# Patient Record
(demographics unavailable — no encounter records)

---

## 2024-10-18 NOTE — RISK ASSESSMENT
[No, patient denies ideation or behavior] : No, patient denies ideation or behavior [Yes, more than three months ago] : Yes, more than three months ago [Mood disorder] : mood disorder [Psychotic disorder] : psychotic disorder [None known] : None known [Identifies reasons for living] : identifies reasons for living [Supportive social network of family or friends] : supportive social network of family or friends [Positive therapeutic relationships] : positive therapeutic relationships [Fear of death/actual act of killing self] : fear of death or the actual act of killing self [Beloved pets] : beloved pets [Low acute suicide risk] : Low acute suicide risk [No] : No [Not clinically indicated] : Safety Plan completed/updated (for individuals at risk): Not clinically indicated [TextBox_32] : in 30s contemplated overdose [FreeTextEntry1] : May have family history of psych disorders requiring hospitalization though not clear; (family with reported schizophrenia)

## 2024-10-18 NOTE — PHYSICAL EXAM
[Cooperative] : cooperative [Clear] : clear [Loud] : loud [[place]] : concrete [WNL] : within normal limits [No] : No [FreeTextEntry1] : morbidly obese female wearing house dress wearing thick glasses [FreeTextEntry8] : neutral [de-identified] : telephone [de-identified] : limited chronically [de-identified] : fair to limited at times

## 2024-10-18 NOTE — REVIEW OF SYSTEMS
[Negative] : Allergic/Immunologic [FreeTextEntry2] : arthritic pains  [FreeTextEntry4] : chronic ulceration of tongue improving per pt [FreeTextEntry6] : shortness of breath with walking reported(chronic)  [FreeTextEntry7] : bouts of diarrhea with recent incontinence since barium swallow mid October 2024 [FreeTextEntry8] : urinary incontinence, chronic with potential accident in office at least once though pt states improved on new medication [FreeTextEntry9] : frequent falls (uses walker) and arthritic pains in shoulders, neck and head per pt [de-identified] : hx of mild mouth movements that may be TD (elicited with distraction only) [de-identified] : chronic odd relatedness secondary to schizophrenia

## 2024-10-18 NOTE — DISCUSSION/SUMMARY
[Date of Last Physical Exam: _____] : Date of Last Physical Exam: [unfilled] [Date of Last Annual Labs: _____] : Date of Last Annual Labs: [unfilled] [Annual Review of Systems Completed?] : Annual Review of Systems Completed: Yes [Tobacco Screening Completed?] : Tobacco Screening Completed: Yes [Date of Last AIMS: _____] : Date of Last AIMS: [unfilled] [Date of Last HbgA1c: _____] : Date of Last HbgA1c: [unfilled] [Date of Last Lipid Profile: _____] : Date of Last Lipid Profile: [unfilled] [Potential impact of patient’s physical health conditions on psychiatric care?] : Potential impact of patient's physical health conditions on psychiatric care: Yes [FreeTextEntry1] : Patient is a 71 year old obese, domiciled in assisted living program, unemployed white female on SSD for much of her life who presented in 2005 for continued treatment of schizophrenia.  Pt with history of approximately five prior psychiatric hospitalizations but has not required hospitalization since in treatment at [facility].   Pt overall at psychiatric baseline though with some episodic anxiety, periods of depression and at times mild paranoia.  Pt oddly related at baseline though overall psychiatrically stable for many years.   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   Overall stable though with reports of fecal incontinence in Oct. 2024 since recent barium swallow study.

## 2024-10-18 NOTE — PLAN
[No] : No [Medication education provided] : Medication education provided. [FreeTextEntry5] : Offered support/encouragement, psychoeducation and counseling regarding diagnosis/diagnoses, medications, symptoms, etc.  Reviewed/discussed plan below:  -Continue Clozapine at 400 mg daily -Have reviewed labs from Sept 2024 and to be done next week Friday; (writer contacted [employer] today) -Continue Abilify at 15 mg daily  -Pt to see [facility] nurse for AIMS every 3 months -To continue Sertraline to 250 mg daily for depression -Continue other non-psychiatric medications as directed  -Continue to reassure patient and offer support and guidance when needed  -To continue f/u about tongue wound treatment- improving per pt report -Have reviewed Kala's experience with [facility]'s Movement Disorder specialist to evaluate possible TD (saw Dr. Leeanne Read) and had been told that she had very mild TD that she was recommended not to take anything for as SEs per pt psychiatric and memory wise are not good; may return to Movement Specialist again if needed though wants to see neurologist through her PCP  -To continue monthly therapy with Dr. Frye -To continue f/u with PCP; and also to f/u with pulmonologist prn -Pt to continue with endocrinologist Pop Lopez (June 6) to continue to address weight issues and work on more aggressive weight loss plan; have discussed elevated BMI and recommendation for weight loss via: exercise, dietary changes and potential use of medications plus recommendation to follow up with PCP and or nutritionist and pt has been intermittently addressing this -SOS-10 to be done when pt not at risk of incontinence in office (some concerning malodor today) -Have reviewed in detail need to limit portion size and eat diabetic diet; reviewed in detail what this would look like and reviewed risks of not following this for patient -Treatment plan completed by writer 9/10/24 -Consult with writer prn  5/25/22: SOS-10 = 39 or mildly impaired 12/9/22 SOS-10:= 42, minimal impairment 8/18/23: SOS-10: = 45, minimal impairment 1/24/24: SOS-10= 55, minimal impairment  40 minutes spent doing E & M and therapy

## 2024-10-18 NOTE — REVIEW OF SYSTEMS
[Negative] : Allergic/Immunologic [FreeTextEntry2] : arthritic pains  [FreeTextEntry4] : chronic ulceration of tongue improving per pt [FreeTextEntry6] : shortness of breath with walking reported(chronic)  [FreeTextEntry7] : bouts of diarrhea with recent incontinence since barium swallow mid October 2024 [FreeTextEntry8] : urinary incontinence, chronic with potential accident in office at least once though pt states improved on new medication [FreeTextEntry9] : frequent falls (uses walker) and arthritic pains in shoulders, neck and head per pt [de-identified] : hx of mild mouth movements that may be TD (elicited with distraction only) [de-identified] : chronic odd relatedness secondary to schizophrenia

## 2024-10-18 NOTE — REASON FOR VISIT
[Telephone (audio) - Individual/Group] : This telephonic visit was provided via audio only technology. [Other Location: e.g. Home (Enter Location, City,State)___] : The provider was located at [unfilled]. [Home] : The patient, [unfilled], was located at home, [unfilled], at the time of the visit. [Verbal consent obtained from patient/other participant(s)] : Verbal consent for telehealth/telephonic services obtained from patient/other participant(s) [Patient] : Patient [FreeTextEntry1] : follow up treatment of Schizophrenia, FARHAN and MDD in remission

## 2024-10-18 NOTE — PLAN
[No] : No [Medication education provided] : Medication education provided. [FreeTextEntry5] : Offered support/encouragement, psychoeducation and counseling regarding diagnosis/diagnoses, medications, symptoms, etc.  Reviewed/discussed plan below:  -Continue Clozapine at 400 mg daily -Have reviewed labs from Sept 2024 and to be done next week Friday; (writer contacted [employer] today) -Continue Abilify at 15 mg daily  -Pt to see [facility] nurse for AIMS every 3 months -To continue Sertraline to 250 mg daily for depression -Continue other non-psychiatric medications as directed  -Continue to reassure patient and offer support and guidance when needed  -To continue f/u about tongue wound treatment- improving per pt report -Have reviewed Kala's experience with [facility]'s Movement Disorder specialist to evaluate possible TD (saw Dr. Leeanne Read) and had been told that she had very mild TD that she was recommended not to take anything for as SEs per pt psychiatric and memory wise are not good; may return to Movement Specialist again if needed though wants to see neurologist through her PCP  -To continue monthly therapy with Dr. Frye -To continue f/u with PCP; and also to f/u with pulmonologist prn -Pt to continue with endocrinologist Pop Lopez (June 6) to continue to address weight issues and work on more aggressive weight loss plan; have discussed elevated BMI and recommendation for weight loss via: exercise, dietary changes and potential use of medications plus recommendation to follow up with PCP and or nutritionist and pt has been intermittently addressing this -SOS-10 to be done when pt not at risk of incontinence in office (some concerning malodor today) -Have reviewed in detail need to limit portion size and eat diabetic diet; reviewed in detail what this would look like and reviewed risks of not following this for patient -Treatment plan completed by writer 9/10/24 -Consult with writer prn  5/25/22: SOS-10 = 39 or mildly impaired 12/9/22 SOS-10:= 42, minimal impairment 8/18/23: SOS-10: = 45, minimal impairment 1/24/24: SOS-10= 55, minimal impairment  40 minutes spent doing E & M and therapy
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
## 2024-10-18 NOTE — HISTORY OF PRESENT ILLNESS
[Not Applicable] : Not applicable [FreeTextEntry1] : Patient with long history of schizophrenia and with emotional difficulties since childhood with reports of unspecified mental problems in her family.  Patient has been relatively psychiatrically stable on her medications since starting [facility] in 2005 without psych hospitalizations though with chronic anxiety, occasional  depression and occasional fleeting paranoia about others mistreating her.   [FreeTextEntry2] : Patient reports history of six prior hospitalizations for depression, suicidal ideations, paranoia and hallucinations.  Stated she would see images (in her mind of scary, menacing clowns or witches laughing at her or would see 'honeymoon lights' flashing at her.)  Patient stated in her 20s she had recurrent visions of a family of mice with the baby mouse doing a dance.  Pt uncertain if these images had appeared realistic as in visual hallucinations or not.  Pt reported that she has a history of hearing people on the television and radio taking directly to her and has felt that she's famous and known all over the world.    Patient reported first hospitalization was at age 12 at [facility] in context of "emotional issues and school phobia."  Had considered herself the "scapegoat" of her family and stated that her parents were "cuckoo."  Pt stated that her second hospitalization was at age 13 at [facility].  Pt went to the [facility] in [place] for residential schooling and treatment;  per pt this was for mentally ill/emotionally disturbed children from "bad families."  Patient stated after three months she returned home because she did not want to stay though said it was a bad decision on her part.  Patient with history of depression, anxiety and feeling "not good enough" as well as history of chronic psychosis.  Patient with hx of passive SI at times and on one occasion in  stated she came close to overdosing but felt a breeze that she thought was a comforting gesture of  family and a friend offering support to her.    Patient was hospitalized again at [facility] in her 20s and reported a hospitalization in her 40s at [facility] and at [facility] also in her 40s.  Pt stated her last hospitalization was when she was 52 years old at [facility] after she accidentally threw out her psychiatric medications.   Patient in therapy (monthly?) with Rosalva Frye since  by reports [FreeTextEntry3] : Orap, Thorazine, Mellaril, Moban, Stelazine, Sinequan, Tofranil, Geodon, Depakote, Klonopin  Currently on Clozapine, Abilify and Zoloft

## 2024-10-18 NOTE — HISTORY OF PRESENT ILLNESS
[Not Applicable] : Not applicable [FreeTextEntry1] : Patient with long history of schizophrenia and with emotional difficulties since childhood with reports of unspecified mental problems in her family.  Patient has been relatively psychiatrically stable on her medications since starting [facility] in 2005 without psych hospitalizations though with chronic anxiety, occasional  depression and occasional fleeting paranoia about others mistreating her.   [FreeTextEntry2] : Patient reports history of six prior hospitalizations for depression, suicidal ideations, paranoia and hallucinations.  Stated she would see images (in her mind of scary, menacing clowns or witches laughing at her or would see 'honeymoon lights' flashing at her.)  Patient stated in her 20s she had recurrent visions of a family of mice with the baby mouse doing a dance.  Pt uncertain if these images had appeared realistic as in visual hallucinations or not.  Pt reported that she has a history of hearing people on the television and radio taking directly to her and has felt that she's famous and known all over the world.    Patient reported first hospitalization was at age 12 at [facility] in context of "emotional issues and school phobia."  Had considered herself the "scapegoat" of her family and stated that her parents were "cuckoo."  Pt stated that her second hospitalization was at age 13 at [facility].  Pt went to the [facility] in [place] for residential schooling and treatment;  per pt this was for mentally ill/emotionally disturbed children from "bad families."  Patient stated after three months she returned home because she did not want to stay though said it was a bad decision on her part.  Patient with history of depression, anxiety and feeling "not good enough" as well as history of chronic psychosis.  Patient with hx of passive SI at times and on one occasion in  stated she came close to overdosing but felt a breeze that she thought was a comforting gesture of  family and a friend offering support to her.    Patient was hospitalized again at [facility] in her 20s and reported a hospitalization in her 40s at [facility] and at [facility] also in her 40s.  Pt stated her last hospitalization was when she was 52 years old at [facility] after she accidentally threw out her psychiatric medications.   Patient in therapy (monthly?) with Rosalva Frye since  by reports [FreeTextEntry3] : Orap, Thorazine, Mellaril, Moban, Stelazine, Sinequan, Tofranil, Geodon, Depakote, Klonopin  Currently on Clozapine, Abilify and Zoloft

## 2024-10-18 NOTE — DISCUSSION/SUMMARY
[Date of Last Physical Exam: _____] : Date of Last Physical Exam: [unfilled] [Date of Last Annual Labs: _____] : Date of Last Annual Labs: [unfilled] [Annual Review of Systems Completed?] : Annual Review of Systems Completed: Yes [Tobacco Screening Completed?] : Tobacco Screening Completed: Yes [Date of Last AIMS: _____] : Date of Last AIMS: [unfilled] [Date of Last HbgA1c: _____] : Date of Last HbgA1c: [unfilled] [Date of Last Lipid Profile: _____] : Date of Last Lipid Profile: [unfilled] [Potential impact of patient’s physical health conditions on psychiatric care?] : Potential impact of patient's physical health conditions on psychiatric care: Yes [FreeTextEntry1] : Patient is a 71 year old obese, domiciled in assisted living program, unemployed white female on SSD for much of her life who presented in 2005 for continued treatment of schizophrenia.  Pt with history of approximately five prior psychiatric hospitalizations but has not required hospitalization since in treatment at [facility].   Pt overall at psychiatric baseline though with some episodic anxiety, periods of depression and at times mild paranoia.  Pt oddly related at baseline though overall psychiatrically stable for many years.   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   Overall stable though with reports of fecal incontinence in Oct. 2024 since recent barium swallow study.

## 2024-10-18 NOTE — PHYSICAL EXAM
[Cooperative] : cooperative [Clear] : clear [Loud] : loud [[place]] : concrete [WNL] : within normal limits [No] : No [FreeTextEntry1] : morbidly obese female wearing house dress wearing thick glasses [FreeTextEntry8] : neutral [de-identified] : telephone [de-identified] : limited chronically [de-identified] : fair to limited at times
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
## 2024-11-14 NOTE — PHYSICAL EXAM
[Cooperative] : cooperative [Clear] : clear [Loud] : loud [[place]] : concrete [WNL] : within normal limits [No] : No [FreeTextEntry1] : morbidly obese female wearing house dress wearing thick glasses [FreeTextEntry8] : neutral [de-identified] : telephone [de-identified] : limited chronically [de-identified] : fair to limited at times

## 2024-11-14 NOTE — PLAN
[No] : No [Medication education provided] : Medication education provided. [FreeTextEntry5] : Offered support/encouragement, psychoeducation and counseling regarding diagnosis/diagnoses, medications, symptoms, etc.  Reviewed/discussed plan below:  -Continue Clozapine at 400 mg daily -Have reviewed labs from October 2024 and will get repeat 11/22/24 confirmed with [employer] today; (order put in 11/14/24 and to renew 5/14/25) -Continue Abilify at 15 mg daily  -Pt to see [facility] nurse for AIMS approximately every 3 months; last seen 10/31/24 and next 2/6/25 -To continue Sertraline to 250 mg daily for depression -Continue other non-psychiatric medications as directed  -Continue to reassure patient and offer support and guidance when needed  -To continue f/u about tongue wound treatment- improving per pt report -Have reviewed Kala's experience with [facility]'s Movement Disorder specialist to evaluate possible TD (saw Dr. Leeanne Read) and had been told that she had very mild TD that she was recommended not to take anything for as SEs per pt psychiatric and memory wise are not good; may return to Movement Specialist again if needed though wants to see neurologist through her PCP  -To continue monthly therapy with Dr. Frye -To continue f/u with PCP; and also to f/u with pulmonologist prn -Pt to continue with endocrinologist Pop Lopez (next 1/2025) to continue to address weight issues and work on more aggressive weight loss plan; have discussed elevated BMI and recommendation for weight loss via: exercise, dietary changes and potential use of medications plus recommendation to follow up with PCP and or nutritionist and pt has been intermittently addressing this -SOS-10 to be done when pt not at risk of incontinence in office -Have reviewed in detail need to limit portion size and eat diabetic diet; reviewed in detail what this would look like and reviewed risks of not following this for patient -Treatment plan completed by writer 9/10/24 -Consult with writer prn  5/25/22: SOS-10 = 39 or mildly impaired 12/9/22 SOS-10:= 42, minimal impairment 8/18/23: SOS-10: = 45, minimal impairment 1/24/24: SOS-10= 55, minimal impairment  30 minute apptmt

## 2024-11-14 NOTE — REASON FOR VISIT
[Telephone (audio) - Individual/Group] : This telephonic visit was provided via audio only technology. [Other Location: e.g. Home (Enter Location, City,State)___] : The provider was located at [unfilled]. [Home] : The patient, [unfilled], was located at home, [unfilled], at the time of the visit. [Participant(s) identity verified] : Participant(s) identity verified. [Verbal consent obtained from patient/other participant(s)] : Verbal consent for telehealth/telephonic services obtained from patient/other participant(s) [Patient] : Patient [FreeTextEntry1] : follow up treatment of Schizophrenia, FARHAN and MDD in remission

## 2024-11-14 NOTE — REVIEW OF SYSTEMS
[Negative] : Allergic/Immunologic [FreeTextEntry2] : arthritic pains, morbid obesity but reports 17 lb weight loss [FreeTextEntry4] : chronic ulceration of tongue, somewhat improved [FreeTextEntry6] : shortness of breath with walking reported(chronic)  [FreeTextEntry7] : bouts of diarrhea with recent incontinence since barium swallow mid October 2024 [FreeTextEntry8] : urinary incontinence, chronic with potential accident in office at least once though pt states improved on new medication [FreeTextEntry9] : frequent falls (uses walker) and arthritic pains in shoulders, neck and head per pt; ankle pain since injury [de-identified] : hx of mild mouth movements that may be TD (elicited with distraction only) [de-identified] : chronic odd relatedness secondary to schizophrenia

## 2024-11-14 NOTE — HISTORY OF PRESENT ILLNESS
[Not Applicable] : Not applicable [FreeTextEntry1] : Patient with long history of schizophrenia and with emotional difficulties since childhood with reports of unspecified mental problems in her family.  Patient has been relatively psychiatrically stable on her medications since starting [facility] in 2005 without psych hospitalizations though with chronic anxiety, occasional  depression and occasional fleeting paranoia about others mistreating her.   [FreeTextEntry2] : Patient reports history of six prior hospitalizations for depression, suicidal ideations, paranoia and hallucinations.  Stated she would see images (in her mind of scary, menacing clowns or witches laughing at her or would see 'honeymoon lights' flashing at her.)  Patient stated in her 20s she had recurrent visions of a family of mice with the baby mouse doing a dance.  Pt uncertain if these images had appeared realistic as in visual hallucinations or not.  Pt reported that she has a history of hearing people on the television and radio taking directly to her and has felt that she's famous and known all over the world.    Patient reported first hospitalization was at age 12 at [facility] in context of "emotional issues and school phobia."  Had considered herself the "scapegoat" of her family and stated that her parents were "cuckoo."  Pt stated that her second hospitalization was at age 13 at [facility].  Pt went to the [facility] in [place] for residential schooling and treatment;  per pt this was for mentally ill/emotionally disturbed children from "bad families."  Patient stated after three months she returned home because she did not want to stay though said it was a bad decision on her part.  Patient with history of depression, anxiety and feeling "not good enough" as well as history of chronic psychosis.  Patient with hx of passive SI at times and on one occasion in  stated she came close to overdosing but felt a breeze that she thought was a comforting gesture of  family and a friend offering support to her.    Patient was hospitalized again at [facility] in her 20s and reported a hospitalization in her 40s at [facility] and at [facility] also in her 40s.  Pt stated her last hospitalization was when she was 52 years old at [facility] after she accidentally threw out her psychiatric medications.   Patient in therapy (monthly?) with Rosalva Frye since  by reports [FreeTextEntry3] : Orap, Thorazine, Mellaril, Moban, Stelazine, Sinequan, Tofranil, Geodon, Depakote, Klonopin  Currently on Clozapine, Abilify and Zoloft

## 2024-11-14 NOTE — DISCUSSION/SUMMARY
[Date of Last Physical Exam: _____] : Date of Last Physical Exam: [unfilled] [Date of Last Annual Labs: _____] : Date of Last Annual Labs: [unfilled] [Annual Review of Systems Completed?] : Annual Review of Systems Completed: Yes [Tobacco Screening Completed?] : Tobacco Screening Completed: Yes [Date of Last AIMS: _____] : Date of Last AIMS: [unfilled] [Date of Last HbgA1c: _____] : Date of Last HbgA1c: [unfilled] [Date of Last Lipid Profile: _____] : Date of Last Lipid Profile: [unfilled] [Potential impact of patient’s physical health conditions on psychiatric care?] : Potential impact of patient's physical health conditions on psychiatric care: Yes [FreeTextEntry1] : Patient is a 71 year old obese, domiciled in assisted living program, unemployed white female on SSD for much of her life who presented in 2005 for continued treatment of schizophrenia.  Pt with history of approximately five prior psychiatric hospitalizations but has not required [facility]   Pt overall at psychiatric baseline though with some episodic anxiety, periods of depression and at times mild paranoia.  Pt oddly related at baseline though overall psychiatrically stable for many years.   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   Overall stable though with reports of fecal incontinence in Oct. 2024 since recent barium swallow study.
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
## 2024-12-07 NOTE — REVIEW OF SYSTEMS
[Negative] : Allergic/Immunologic [FreeTextEntry2] : arthritic pains, morbid obesity but reports 17 lb weight loss [FreeTextEntry4] : chronic ulceration of tongue, somewhat improved [FreeTextEntry6] : shortness of breath with walking reported(chronic)  [FreeTextEntry7] : bouts of diarrhea with recent incontinence since barium swallow mid October 2024 [FreeTextEntry8] : urinary incontinence, chronic with potential accident in office at least once though pt states improved on new medication [FreeTextEntry9] : frequent falls (uses walker) and arthritic pains in shoulders, neck and head per pt; ankle pain since injury [de-identified] : hx of mild mouth movements that may be TD (elicited with distraction only) [de-identified] : chronic odd relatedness secondary to schizophrenia

## 2024-12-07 NOTE — PLAN
[No] : No [Medication education provided] : Medication education provided. [FreeTextEntry5] : Offered support/encouragement, psychoeducation and counseling regarding diagnosis/diagnoses, medications, symptoms, etc.  Reviewed/discussed plan below:  -Continue Clozapine at 400 mg daily -Have reviewed labs from November 22, 2024 and ordered for two weeks from now -Continue Abilify at 15 mg daily  -Pt to see [facility] nurse for AIMS approximately every 3 months; last seen 10/31/24 and next 2/6/25 -To continue Sertraline to 250 mg daily for depression -Continue other non-psychiatric medications as directed  -Continue to reassure patient and offer support and guidance when needed  -To continue f/u about tongue wound treatment- improving per pt report -Have reviewed Kala's experience with [facility]'s Movement Disorder specialist to evaluate possible TD (saw Dr. 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 Frye -To continue f/u with PCP; and also to f/u with pulmonologist prn -May f/u re: nasal sprays and also f/u re: omeprazole and famotidine to see if she's to be taking both as she was told not to  -Pt to continue with endocrinologist Pop Lopez (next 1/2025) to continue to address weight issues and work on more aggressive weight loss plan; have discussed elevated BMI and recommendation for weight loss via: exercise, dietary changes and potential use of medications plus recommendation to follow up with PCP and or nutritionist and pt has been intermittently addressing this -SOS-10 to be done in future -Have reviewed in detail need to limit portion size and eat diabetic diet; reviewed in detail what this would look like and reviewed risks of not following this for patient -Treatment plan completed by writer 9/10/24 -Consult with writer prn  5/25/22: SOS-10 = 39 or mildly impaired 12/9/22 SOS-10:= 42, minimal impairment 8/18/23: SOS-10: = 45, minimal impairment 1/24/24: SOS-10= 55, minimal impairment  45 minutes spent reviewing prior records/notes, reviewing most recent labs, seeing patient and recording session note
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
## 2024-12-07 NOTE — REASON FOR VISIT
[Patient] : Patient [FreeTextEntry1] : follow up treatment of Schizophrenia, FARHAN and MDD in remission

## 2024-12-07 NOTE — HISTORY OF PRESENT ILLNESS
[Not Applicable] : Not applicable [FreeTextEntry1] : Patient with long history of schizophrenia and with emotional difficulties since childhood with reports of unspecified mental problems in her family.  Patient has been relatively psychiatrically stable on her medications since starting [facility] in 2005 without psych hospitalizations though with chronic anxiety, occasional  depression and occasional fleeting paranoia about others mistreating her.   [FreeTextEntry2] : Patient reports history of six prior hospitalizations for depression, suicidal ideations, paranoia and hallucinations.  Stated she would see images (in her mind of scary, menacing clowns or witches laughing at her or would see 'honeymoon lights' flashing at her.)  Patient stated in her 20s she had recurrent visions of a family of mice with the baby mouse doing a dance.  Pt uncertain if these images had appeared realistic as in visual hallucinations or not.  Pt reported that she has a history of hearing people on the television and radio taking directly to her and has felt that she's famous and known all over the world.    Patient reported first hospitalization was at age 12 at [facility] in context of "emotional issues and school phobia."  Had considered herself the "scapegoat" of her family and stated that her parents were "cuckoo."  Pt stated that her second hospitalization was at age 13 at [facility].  Pt went to the [facility] in [place] for residential schooling and treatment;  per pt this was for mentally ill/emotionally disturbed children from "bad families."  Patient stated after three months she returned home because she did not want to stay though said it was a bad decision on her part.  Patient with history of depression, anxiety and feeling "not good enough" as well as history of chronic psychosis.  Patient with hx of passive SI at times and on one occasion in  stated she came close to overdosing but felt a breeze that she thought was a comforting gesture of  family and a friend offering support to her.    Patient was hospitalized again at [facility] in her 20s and reported a hospitalization in her 40s at [facility] and at [facility] also in her 40s.  Pt stated her last hospitalization was when she was 52 years old at [facility] after she accidentally threw out her psychiatric medications.   Patient in therapy (monthly?) with Rosalva Frye since  by reports [FreeTextEntry3] : Orap, Thorazine, Mellaril, Moban, Stelazine, Sinequan, Tofranil, Geodon, Depakote, Klonopin  Currently on Clozapine, Abilify and Zoloft

## 2024-12-07 NOTE — PHYSICAL EXAM
Consults   Nephrology Consult Note    Reason for Consult:  Acute renal injury  Requesting Physician:  Dr Alex Bennett     Chief Complaint: SOB and respiratory distress. History Obtained From:  electronic medical record    History of Present Illness: This is a 67 y.o. male who presented to the hospital for evaluation of  Increasing SOB . Pt was initially evaluated around 15th of this month for SOB however refused admission. He was recently diagnosed with influenza A infection treated with tamiflu  He went back to ER at [facility] and was admitted there for a day and then transferred here for worsening resp status. He was treated for flu and possible PNA. Pt has a hx of chronic AFib and was taking pradaxa at home. He was started on heparin and amiodarone and lopressor for AFib     Looking at his labs he had a normal creat up until 3 days ago. He developed hypotension and shock with low grade fever on 3/27 . Urine output dropped as well. He had a significant drop in his hemoglobin and a CAT scan showed evidence of a large retroperitoneal bleed. He required pressor support and was on 4 pressors initially . Today he is on 3 pressors, off epi. His urine output is still low. Creat has steadily risen to 3.7 today. Creat was 0.8 on 3/26 . Family  denies any hx of heavy or prolonged NSAID use. There is no history of blood or bone marrow disorders. There is no hx of jaundice or hepatitis or sexually transmitted disease. Pt has no hx of collagen vascular disease or vasculitis. No history of dysuria or frequency. No recent procedures involving IV contrast. There is no hx of paraprotein disease. No hx of recurrent UTI , incontinence or recurrent nephrolithiasis. Past Medical History:        Diagnosis Date    Abnormal nuclear stress test 03/12/2010    Stress and resting cardiolite images showed inferior wall hypoperfusion suggestive of previous myocardial infarction.  Left ventricular wall motion showed inferior wall hypokinesia. EF 58%.  Arrhythmia 2007    A single episode    Atrial fibrillation ([address] 2007    Single Episode    CAD (coronary artery disease) 1996    MI    DM type 2 (diabetes mellitus, type 2) ([address] 2005    Hx of echocardiogram 04/05/2007    EF 62%, Increased left atrial and right ventricular diametes. Increased septal and posterior wall thickness suggest left ventricular hypertrophy.  Valves were normal.        Past Surgical History:        Procedure Laterality Date    CARDIOVERSION  05/18/2007    Successful-Dr. Tera Arroyo    CARDIOVERSION  03/13    CORONARY ANGIOPLASTY  1996       Current Medications:      norepinephrine (LEVOPHED) 16 mg in dextrose 5% 250 mL infusion Continuous   piperacillin-tazobactam (ZOSYN) 3.375 g in dextrose 5 % 50 mL IVPB (mini-bag) Q8H   insulin regular (HUMULIN R;NOVOLIN R) 100 Units in sodium chloride 0.9 % 100 mL infusion Continuous   hydrocortisone sodium succinate PF (SOLU-CORTEF) injection 100 mg Q8H   EPINEPHrine (EPINEPHrine HCL) 5 mg in sodium chloride 0.9 % 250 mL infusion Continuous   phenylephrine (KHADRA-SYNEPHRINE) 50 mg in sodium chloride 0.9 % 250 mL infusion Continuous   vasopressin 20 Units in sodium chloride 0.9 % 100 mL infusion Continuous   amiodarone (CORDARONE) 450 mg in sodium chloride 0.9 % 250 mL infusion Continuous   pantoprazole (PROTONIX) injection 40 mg Daily   And    sodium chloride (PF) 0.9 % injection 10 mL Daily   oxyCODONE (ROXICODONE) immediate release tablet 5 mg Q4H PRN   0.9 % sodium chloride infusion Continuous   acetaminophen (TYLENOL) tablet 650 mg Q4H PRN   midazolam (VERSED) 100 mg in dextrose 5% 100 mL infusion Continuous   magnesium hydroxide (MILK OF MAGNESIA) 400 MG/5ML suspension 30 mL Daily PRN   albuterol (PROVENTIL) nebulizer solution 2.5 mg Q6H PRN   docusate (COLACE) 50 MG/5ML liquid 100 mg Daily   LORazepam (ATIVAN) injection 0.5 mg Q8H PRN   sodium chloride flush 0.9 % injection 10 mL 2 times per day   sodium chloride flush 0.9 % injection 10 mL PRN   ondansetron (ZOFRAN) injection 4 mg Q6H PRN   glucose (GLUTOSE) 40 % oral gel 15 g PRN   dextrose 50 % solution 12.5 g PRN   glucagon (rDNA) injection 1 mg PRN   dextrose 5 % solution PRN       Allergies:  Patient has no known allergies. Social History:   Social History     Socioeconomic History    Marital status:      Spouse name: Not on file    Number of children: Not on file    Years of education: Not on file    Highest education level: Not on file   Occupational History    Not on file   Social Needs    Financial resource strain: Not on file    Food insecurity:     Worry: Not on file     Inability: Not on file    Transportation needs:     Medical: Not on file     Non-medical: Not on file   Tobacco Use    Smoking status: Former Smoker     Packs/day: 1.50     Years: 30.00     Pack years: 45.00     Types: Cigarettes     Last attempt to quit: 1996     Years since quittin.8    Smokeless tobacco: Never Used   Substance and Sexual Activity    Alcohol use: No    Drug use: No    Sexual activity: Not on file   Lifestyle    Physical activity:     Days per week: Not on file     Minutes per session: Not on file    Stress: Not on file   Relationships    Social connections:     Talks on phone: Not on file     Gets together: Not on file     Attends Sikh service: Not on file     Active member of club or organization: Not on file     Attends meetings of clubs or organizations: Not on file     Relationship status: Not on file    Intimate partner violence:     Fear of current or ex partner: Not on file     Emotionally abused: Not on file     Physically abused: Not on file     Forced sexual activity: Not on file   Other Topics Concern    Not on file   Social History Narrative    Not on file       Family History:   No family history on file. Review of Systems:    Constitutional: No fever, no chills, no lethargy, no weakness.   HEENT: No headache, otalgia, itchy eyes, nasal discharge or sore throat. Cardiac:  No chest pain, + dyspnea, . Chest:              No cough, phlegm or wheezing. Abdomen:  No abdominal pain, nausea or vomiting. Neuro:  No focal weakness, abnormal movements orseizure like activity. Skin:   No rashes, no itching. :   No hematuria, no pyuria, no dysuria, no flank pain. Extremities:  No swelling or joint pains. Objective:  CURRENT TEMPERATURE:  Temp: 99.9 °F (37.7 °C)  MAXIMUM TEMPERATURE OVER 24HRS:  Temp (24hrs), Av.9 °F (38.3 °C), Min:99.9 °F (37.7 °C), Max:101.5 °F (38.6 °C)    CURRENT RESPIRATORY RATE:  Resp: 22  CURRENT PULSE:  Pulse: 104  CURRENT BLOOD PRESSURE:  BP: 115/69  24HR BLOOD PRESSURE RANGE:  Systolic (24NPH), FDK:16 , Min:70 , LH   ; Diastolic (92NKM), CPX:83, Min:27, Max:87    24HR INTAKE/OUTPUT:      Intake/Output Summary (Last 24 hours) at 3/29/2019 1028  Last data filed at 3/29/2019 0800  Gross per 24 hour   Intake 8650.61 ml   Output 139 ml   Net 8511.61 ml     Patient Vitals for the past 96 hrs (Last 3 readings):   Weight   19 0600 299 lb 13.2 oz (136 kg)   19 0400 280 lb 3.3 oz (127.1 kg)   19 0600 280 lb 10.3 oz (127.3 kg)     Physical Exam:    General appearance: intubated , sedated   Skin: warm and dry, no rash or erythema  Eyes: conjunctivae pale   ENT: : ETT in place    Neck: no carotid bruit ,no  JVD,no carotid Lymphadenopathy, noThyromegaly   Pulmonary: no wheezing or rhonchi. No rales heard.   Cardiovascular: Normal S1 & S2,  No S3 or  S4, no Pericardial Rub no Murmur   Abdomen: bowel sounds not heard , somewhat tense , no ascites  Extremities:no cyanosis, clubbing + edema    Labs:   CBC:  Recent Labs     19  0447 19  0432  19  0416 19  0612 19  0922   WBC 20.1* 27.1*  --   --   --   --  45.3*  --    RBC 3.24* 2.46*  --   --   --   --  2.66*  --    HGB 10.0* 7.6*   < > 9.0*   < > 8.4* 8.2* 8.6*   HCT 31.0* 23.9*   < > 25.6*   < > 24.1* 23.7* 24.7*   MCV 95.7 97.2  --   --   --   --  89.1  --    MCH 30.9 30.9  --   --   --   --  30.8  --    MCHC 32.3 31.8  --   --   --   --  34.6  --    RDW 13.4 13.8  --   --   --   --  14.7*  --     274  --  228  --   --  217  --    MPV 10.7 10.9  --   --   --   --  11.3  --     < > = values in this interval not displayed. BMP: Recent Labs     03/28/19 0432 03/28/19 2029 03/29/19 0612    135 136   K 4.8 4.4 5.0   CL 94* 96* 97*   CO2 33* 21 23   BUN 68* 84* 87*   CREATININE 1.83* 3.21* 3.74*   GLUCOSE 383* 320* 146*   CALCIUM 8.7 7.7* 7.5*        Phosphorus:    Recent Labs     03/27/19 0447 03/28/19 0432 03/29/19  0612   PHOS 3.2 5.1* 5.4*     Magnesium:   Recent Labs     03/27/19 0447 03/28/19 0432 03/29/19 0612   MG 2.0 2.5 2.8*     Albumin:   Recent Labs     03/28/19  1025   LABALBU 2.9*         SPEP: Lab Results   Component Value Date    PROT 5.3 03/28/2019     Urine Creatinine:    Lab Results   Component Value Date    LABCREA 196.8 06/09/2017       Urinalysis:  U/A: Lab Results   Component Value Date    NITRU NEGATIVE 03/28/2019    COLORU DARK YELLOW 03/28/2019    PHUR 5.0 03/28/2019    WBCUA 5 TO 10 03/28/2019    RBCUA 5 TO 10 03/28/2019    MUCUS NOT REPORTED 03/28/2019    TRICHOMONAS NOT REPORTED 03/28/2019    YEAST NOT REPORTED 03/28/2019    BACTERIA NOT REPORTED 03/28/2019    SPECGRAV 1.024 03/28/2019    LEUKOCYTESUR TRACE 03/28/2019    UROBILINOGEN Normal 03/28/2019    BILIRUBINUR NEGATIVE  Verified by ictotest. 03/28/2019    GLUCOSEU TRACE 03/28/2019    KETUA TRACE 03/28/2019    AMORPHOUS NOT REPORTED 03/28/2019     CAT SCAN ;     1. Large left retroperitoneal hematoma extending from the posterior   hemidiaphragm to the upper pelvis.  Hematocrit effect with a hematoma   measuring maximally 9.3 cm in diameter and about 14 cm in length.  There is   also a small amount of upper abdominal ascites.  No free air.    2. Bilateral pleural effusions with [Cooperative] : cooperative [Clear] : clear [Loud] : loud [[place]] : concrete [WNL] : within normal limits [No] : No [de-identified] : telephone [Constricted] : constricted [FreeTextEntry1] : morbidly obese female wearing house dress and thick glasses [FreeTextEntry8] : neutral [de-identified] : limited chronically [de-identified] : fair to limited at times

## 2024-12-07 NOTE — DISCUSSION/SUMMARY
[Date of Last Physical Exam: _____] : Date of Last Physical Exam: [unfilled] [Date of Last Annual Labs: _____] : Date of Last Annual Labs: [unfilled] [Annual Review of Systems Completed?] : Annual Review of Systems Completed: Yes [Tobacco Screening Completed?] : Tobacco Screening Completed: Yes [Date of Last AIMS: _____] : Date of Last AIMS: [unfilled] [Date of Last HbgA1c: _____] : Date of Last HbgA1c: [unfilled] [Date of Last Lipid Profile: _____] : Date of Last Lipid Profile: [unfilled] [Potential impact of patient’s physical health conditions on psychiatric care?] : Potential impact of patient's physical health conditions on psychiatric care: Yes [FreeTextEntry1] : Patient is a 71 year old obese, domiciled in assisted living program, unemployed white female on SSD for much of her life who presented in 2005 for continued treatment of schizophrenia.  Pt with history of approximately five prior psychiatric hospitalizations but has not required hospitalization since in treatment at [facility].   Pt overall at psychiatric baseline though with some episodic anxiety, periods of depression and at times mild paranoia.  Pt oddly related at baseline though overall psychiatrically stable for many years.   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   Overall stable though with reports of periodic incontinence of bladder and bowels.

## 2024-12-07 NOTE — DISCUSSION/SUMMARY
[Date of Last Physical Exam: _____] : Date of Last Physical Exam: [unfilled] [Date of Last Annual Labs: _____] : Date of Last Annual Labs: [unfilled] [Annual Review of Systems Completed?] : Annual Review of Systems Completed: Yes [Tobacco Screening Completed?] : Tobacco Screening Completed: Yes [Date of Last AIMS: _____] : Date of Last AIMS: [unfilled] [Date of Last HbgA1c: _____] : Date of Last HbgA1c: [unfilled] [Date of Last Lipid Profile: _____] : Date of Last Lipid Profile: [unfilled] [Potential impact of patient’s physical health conditions on psychiatric care?] : Potential impact of patient's physical health conditions on psychiatric care: Yes [FreeTextEntry1] : Patient is a 71 year old obese, domiciled in assisted living program, unemployed white female on SSD for much of her life who presented in 2005 for continued treatment of schizophrenia.  Pt with history of approximately five prior psychiatric hospitalizations but has not required hospitalization since in treatment at [facility].   Pt overall at psychiatric baseline though with some episodic anxiety, periods of depression and at times mild paranoia.  Pt oddly related at baseline though overall psychiatrically stable for many years.   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   Overall stable though with reports of periodic incontinence of bladder and bowels.

## 2024-12-07 NOTE — PHYSICAL EXAM
[Cooperative] : cooperative [Clear] : clear [Loud] : loud [[place]] : concrete [WNL] : within normal limits [No] : No [de-identified] : telephone [Constricted] : constricted [FreeTextEntry1] : morbidly obese female wearing house dress and thick glasses [FreeTextEntry8] : neutral [de-identified] : limited chronically [de-identified] : fair to limited at times

## 2024-12-07 NOTE — HISTORY OF PRESENT ILLNESS
[Not Applicable] : Not applicable [FreeTextEntry1] : Patient with long history of schizophrenia and with emotional difficulties since childhood with reports of unspecified mental problems in her family.  Patient has been relatively psychiatrically stable on her medications since starting [facility] in 2005 without psych hospitalizations though with chronic anxiety, occasional  depression and occasional fleeting paranoia about others mistreating her.   [FreeTextEntry2] : Patient reports history of six prior hospitalizations for depression, suicidal ideations, paranoia and hallucinations.  Stated she would see images (in her mind of scary, menacing clowns or witches laughing at her or would see 'honeymoon lights' flashing at her.)  Patient stated in her 20s she had recurrent visions of a family of mice with the baby mouse doing a dance.  Pt uncertain if these images had appeared realistic as in visual hallucinations or not.  Pt reported that she has a history of hearing people on the television and radio taking directly to her and has felt that she's famous and known all over the world.    Patient reported first hospitalization was at age 12 at [facility] in context of "emotional issues and school phobia."  Had considered herself the "scapegoat" of her family and stated that her parents were "cuckoo."  Pt stated that her second hospitalization was at age 13 at [facility].  Pt went to the [facility] in [place] for residential schooling and treatment;  per pt this was for mentally ill/emotionally disturbed children from "bad families."  Patient stated after three months she returned home because she did not want to stay though said it was a bad decision on her part.  Patient with history of depression, anxiety and feeling "not good enough" as well as history of chronic psychosis.  Patient with hx of passive SI at times and on one occasion in  stated she came close to overdosing but felt a breeze that she thought was a comforting gesture of  family and a friend offering support to her.    Patient was hospitalized again at [facility] in her 20s and reported a hospitalization in her 40s at [facility] and at [facility] also in her 40s.  Pt stated her last hospitalization was when she was 52 years old at [facility] after she accidentally threw out her psychiatric medications.   Patient in therapy (monthly?) with Rosalva Frye since  by reports [FreeTextEntry3] : Orap, Thorazine, Mellaril, Moban, Stelazine, Sinequan, Tofranil, Geodon, Depakote, Klonopin  Currently on Clozapine, Abilify and Zoloft

## 2024-12-07 NOTE — PLAN
[No] : No [Medication education provided] : Medication education provided. [FreeTextEntry5] : Offered support/encouragement, psychoeducation and counseling regarding diagnosis/diagnoses, medications, symptoms, etc.  Reviewed/discussed plan below:  -Continue Clozapine at 400 mg daily -Have reviewed labs from November 22, 2024 and ordered for two weeks from now -Continue Abilify at 15 mg daily  -Pt to see [facility] nurse for AIMS approximately every 3 months; last seen 10/31/24 and next 2/6/25 -To continue Sertraline to 250 mg daily for depression -Continue other non-psychiatric medications as directed  -Continue to reassure patient and offer support and guidance when needed  -To continue f/u about tongue wound treatment- improving per pt report -Have reviewed Kala's experience with [facility]'s Movement Disorder specialist to evaluate possible TD (saw Dr. 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 Frye -To continue f/u with PCP; and also to f/u with pulmonologist prn -May f/u re: nasal sprays and also f/u re: omeprazole and famotidine to see if she's to be taking both as she was told not to  -Pt to continue with endocrinologist Pop Lopez (next 1/2025) to continue to address weight issues and work on more aggressive weight loss plan; have discussed elevated BMI and recommendation for weight loss via: exercise, dietary changes and potential use of medications plus recommendation to follow up with PCP and or nutritionist and pt has been intermittently addressing this -SOS-10 to be done in future -Have reviewed in detail need to limit portion size and eat diabetic diet; reviewed in detail what this would look like and reviewed risks of not following this for patient -Treatment plan completed by writer 9/10/24 -Consult with writer prn  5/25/22: SOS-10 = 39 or mildly impaired 12/9/22 SOS-10:= 42, minimal impairment 8/18/23: SOS-10: = 45, minimal impairment 1/24/24: SOS-10= 55, minimal impairment  45 minutes spent reviewing prior records/notes, reviewing most recent labs, seeing patient and recording session note

## 2024-12-07 NOTE — REVIEW OF SYSTEMS
[Negative] : Allergic/Immunologic [FreeTextEntry2] : arthritic pains, morbid obesity but reports 17 lb weight loss [FreeTextEntry4] : chronic ulceration of tongue, somewhat improved [FreeTextEntry6] : shortness of breath with walking reported(chronic)  [FreeTextEntry7] : bouts of diarrhea with recent incontinence since barium swallow mid October 2024 [FreeTextEntry8] : urinary incontinence, chronic with potential accident in office at least once though pt states improved on new medication [FreeTextEntry9] : frequent falls (uses walker) and arthritic pains in shoulders, neck and head per pt; ankle pain since injury [de-identified] : hx of mild mouth movements that may be TD (elicited with distraction only) [de-identified] : chronic odd relatedness secondary to schizophrenia
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
## 2024-12-23 NOTE — PHYSICAL EXAM
[Cooperative] : cooperative [Constricted] : constricted [Clear] : clear [Loud] : loud [[place]] : concrete [WNL] : within normal limits [No] : No [FreeTextEntry1] : morbidly obese female wearing house dress and thick glasses [FreeTextEntry8] : neutral [de-identified] : limited chronically [de-identified] : fair to limited at times

## 2024-12-23 NOTE — DISCUSSION/SUMMARY
[Date of Last Physical Exam: _____] : Date of Last Physical Exam: [unfilled] [Date of Last Annual Labs: _____] : Date of Last Annual Labs: [unfilled] [Annual Review of Systems Completed?] : Annual Review of Systems Completed: Yes [Tobacco Screening Completed?] : Tobacco Screening Completed: Yes [Date of Last AIMS: _____] : Date of Last AIMS: [unfilled] [Date of Last HbgA1c: _____] : Date of Last HbgA1c: [unfilled] [Date of Last Lipid Profile: _____] : Date of Last Lipid Profile: [unfilled] [Potential impact of patient’s physical health conditions on psychiatric care?] : Potential impact of patient's physical health conditions on psychiatric care: Yes [FreeTextEntry1] : Patient is a 71 year old obese, domiciled in assisted living program, unemployed white female on SSD for much of her life who presented in 2005 for continued treatment of schizophrenia.  Pt with history of approximately five prior psychiatric hospitalizations but has not required hospitalization since in treatment at [facility].   Pt overall at psychiatric baseline though with some episodic anxiety, periods of depression and at times mild paranoia.  Pt oddly related at baseline though overall psychiatrically stable for many years.   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   Overall stable though with reports of periodic incontinence of bladder and bowels, some insomnia and forgetfulness.

## 2024-12-23 NOTE — REVIEW OF SYSTEMS
[Negative] : Allergic/Immunologic [FreeTextEntry2] : arthritic pains, morbid obesity but reports 17 lb weight loss [FreeTextEntry4] : chronic ulceration of tongue, somewhat improved [FreeTextEntry6] : shortness of breath with walking reported(chronic)  [FreeTextEntry7] : bouts of diarrhea with recent incontinence since barium swallow mid October 2024 [FreeTextEntry8] : urinary incontinence, chronic with potential accident in office at least once though pt states improved on new medication [FreeTextEntry9] : frequent falls (uses walker) and arthritic pains in shoulders, neck and head per pt; ankle pain since injury [de-identified] : hx of mild mouth movements that may be TD (elicited with distraction only) [de-identified] : chronic odd relatedness secondary to schizophrenia but overall at baseline

## 2024-12-23 NOTE — HISTORY OF PRESENT ILLNESS
[Not Applicable] : Not applicable [FreeTextEntry1] : Patient with long history of schizophrenia and with emotional difficulties since childhood with reports of unspecified mental problems in her family.  Patient has been relatively psychiatrically stable on her medications since starting [facility] in 2005 without psych hospitalizations though with chronic anxiety, occasional  depression and occasional fleeting paranoia about others mistreating her.   [FreeTextEntry2] : Patient reports history of six prior hospitalizations for depression, suicidal ideations, paranoia and hallucinations.  Stated she would see images (in her mind of scary, menacing clowns or witches laughing at her or would see 'honeymoon lights' flashing at her.)  Patient stated in her 20s she had recurrent visions of a family of mice with the baby mouse doing a dance.  Pt uncertain if these images had appeared realistic as in visual hallucinations or not.  Pt reported that she has a history of hearing people on the television and radio taking directly to her and has felt that she's famous and known all over the world.    Patient reported first hospitalization was at age 12 at [facility] in context of "emotional issues and school phobia."  Had considered herself the "scapegoat" of her family and stated that her parents were "cuckoo."  Pt stated that her second hospitalization was at age 13 at [facility].  Pt went to the [facility] in [place] for residential schooling and treatment;  per pt this was for mentally ill/emotionally disturbed children from "bad families."  Patient stated after three months she returned home because she did not want to stay though said it was a bad decision on her part.  Patient with history of depression, anxiety and feeling "not good enough" as well as history of chronic psychosis.  Patient with hx of passive SI at times and on one occasion in  stated she came close to overdosing but felt a breeze that she thought was a comforting gesture of  family and a friend offering support to her.    Patient was hospitalized again at [facility] in her 20s and reported a hospitalization in her 40s at [facility] and at [facility] also in her 40s.  Pt stated her last hospitalization was when she was 52 years old at [facility] after she accidentally threw out her psychiatric medications.   Patient in therapy (monthly?) with Rosalva Frye since  by reports [FreeTextEntry3] : Orap, Thorazine, Mellaril, Moban, Stelazine, Sinequan, Tofranil, Geodon, Depakote, Klonopin  Currently on Clozapine, Abilify and Zoloft
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
## 2024-12-23 NOTE — REASON FOR VISIT
[Patient] : Patient [Patient preference] : as per patient preference [Telephone (audio) - Individual/Group] : This telephonic visit was provided via audio only technology. [Other Location: e.g. Home (Enter Location, City,State)___] : The provider was located at [unfilled]. [Home] : The patient, [unfilled], was located at home, [unfilled], at the time of the visit. [Participant(s) identity verified] : Participant(s) identity verified. [Verbal consent obtained from patient/other participant(s)] : Verbal consent for telehealth/telephonic services obtained from patient/other participant(s) [FreeTextEntry4] : 9:30 am [FreeTextEntry5] : 10 am [FreeTextEntry2] : 12/6/24 [FreeTextEntry1] : follow up treatment of Schizophrenia, FARHAN and MDD in remission

## 2024-12-23 NOTE — PLAN
[No] : No [Medication education provided] : Medication education provided. [FreeTextEntry5] : Offered support/encouragement, psychoeducation and counseling regarding diagnosis/diagnoses, medications, symptoms, etc.  Reviewed/discussed plan below:  -Continue Clozapine at 400 mg daily -Will be getting CBC with diff on 12/27/24 with home draw confirmed via email -Reviewed Benadryl 25-50 mg qhs for insomnia and may consider hydroxyzine or another medication if this is not effective -Continue Abilify at 15 mg daily -Pt to see [facility] nurse for AIMS approximately every 3 months; last seen 10/31/24 and next scheduled for 2/6/25 -To continue Sertraline to 250 mg daily for depression -Continue other non-psychiatric medications as directed  -Continue to reassure patient and offer support and guidance when needed  -To continue f/u about tongue wound status; (appeared much improved at last visit Dec 6,2024 with writer) -Have reviewed Kala's experience with [facility]'s Movement Disorder specialist to evaluate possible TD (saw Dr. 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 Frye -To continue f/u with PCP; and also to f/u with pulmonologist prn -May f/u re: nasal sprays and also f/u re: omeprazole and famotidine to see if she's to be taking both as she was told not to  -Pt to continue with endocrinologist Pop Lopez (next 1/2025 ?) to continue to address weight issues and work on more aggressive weight loss plan; have discussed elevated BMI and recommendation for weight loss via: exercise, dietary changes and potential use of medications plus recommendation to follow up with Jessica re this -Have reviewed in detail need to limit portion size and eat diabetic diet; reviewed in detail what this would look like and reviewed risks of not following this for patient -SOS-10 to be done in future -Treatment plan completed by writer 9/10/24 -Consult with writer prn  5/25/22: SOS-10 = 39 or mildly impaired 12/9/22 SOS-10:= 42, minimal impairment 8/18/23: SOS-10: = 45, minimal impairment 1/24/24: SOS-10= 55, minimal impairment  30 minutes spent reviewing prior records/notes, reviewing most recent labs, seeing patient and recording session note; (telephone visit)

## 2025-04-26 NOTE — REASON FOR VISIT
[Patient no longer able to participate due to medical/psychiatric reasons] : Patient no longer able to participate due to medical/psychiatric reasons [FreeTextEntry9] : 4/29/2005 [FreeTextEntry8] : 4/26/2025 [FreeTextEntry1] : Patient has been inpatient for prolonged period of time due to significant respiratory issues  [FreeTextEntry2] : Per patient was sick since age 12 with "emotional problems and school phobia" that developed into psychosis.

## 2025-04-26 NOTE — HISTORY OF PRESENT ILLNESS
[Suicidal Behavior/Ideation] : suicidal behavior/ideation [Not Applicable] : Not applicable [FreeTextEntry1] : Ese is a 72 year old single, white, morbidly obese female who has been residing in an Assisted Living for several years and supported with SSD and a trust fund that had been arranged by her family.   She has been in treatment at [facility] for Schizophrenia over the past 16 years and has been fairly stable on Clozapine and Abilify though she has some chronic paranoid fears that flare at times as well as chronic anxiety related to her paranoia and depressive episodes.  Ese has additionally been in treatment with a therapist, Melva Cooley for many years.    [FreeTextEntry2] : Patient reports history of six prior hospitalizations for depression, suicidal ideations, paranoia and hallucinations. Stated she would see images (in her mind of scary, menacing clowns or witches laughing at her or would see 'honeymoon lights' flashing at her.) Patient stated in her 20s she had recurrent visions of a family of mice with the baby mouse doing a dance. Pt uncertain if these images had appeared realistic as in visual hallucinations or not. Pt reported that she has a history of hearing people on the television and radio taking directly to her and has felt that she's famous and known all over the world.  Patient reported first hospitalization was at age 12 at [facility] in context of "emotional issues and school phobia." Had considered herself the "scapegoat" of her family and stated that her parents were "cuckoo." Pt stated that her second hospitalization was at age 13 at [facility]. Pt went to the [facility] in [place] for residential schooling and treatment; per pt this was for mentally ill/emotionally disturbed children from "bad families." Patient stated after three months she returned home because she did not want to stay though said it was a bad decision on her part.  Patient with history of depression, anxiety and feeling "not good enough" as well as history of chronic psychosis. Patient with hx of passive SI at times and on one occasion in  stated she came close to overdosing but felt a breeze that she thought was a comforting gesture of  family and a friend offering support to her.  Patient was hospitalized again at [facility] in her 20s and reported a hospitalization in her 40s at [facility] and at [facility] also in her 40s. Pt stated her last hospitalization was when she was 52 years old at [facility] after she accidentally threw out her psychiatric medications.  Patient in therapy (monthly?) with Romaine Frye since  by reports.   [FreeTextEntry3] : Orap, Thorazine, Stephanie, Rene, Stelazine, Sinequan, Tofranil, Geodon, Depakote, Klonopin

## 2025-04-26 NOTE — PLAN
[de-identified] : Closing case due to protracted hospitalization and potential per Kala for transition to rehab/nursing home for unclear duration.
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
## 2025-05-15 NOTE — REVIEW OF SYSTEMS
[Negative] : Allergic/Immunologic [FreeTextEntry9] : continues with falls at times [de-identified] : memory issues but otherwise stable

## 2025-05-15 NOTE — PSYCHOSOCIAL ASSESSMENT
[None known] : None known [HS Diploma or GED] : HS Diploma or GED [Yes (select details below)] : Have you ever experienced this type of event? Yes [tried hard not to think about the event(s) or went out of your way to avoid situations that reminded you of the event] : tried hard to avoid thinking about events or avoid situations that reminded patient of the event [has been constantly on guard, watchful, or easily startled] : has been constantly on guard, watchful, or easily startled [felt guilty or unable to stop blaming yourself or others for the event(s) or any problems the event(s) may have caused] : has felt guilty or unable to stop blaming self or others for event(s), or any problems the event(s) may have caused [Unemployed and not looking for work] : unemployed and not looking for work [SSD] : SSD [Yes] : yes [No] : Prior or current active [place]  service? No [Yes (add details)] : Patient has personal representation (legal guardian, representative payee, conservatorship)? Yes [had nightmares about the event(s) or thought about the event(s) when you did not want] : did not have nightmares and/or unwanted thoughts about the events [felt numb or detached from people, activities, or your surrounding] : has not felt numb or detached from people, activities, or surroundings [FreeTextEntry3] : has a family trust as well [FreeTextEntry6] : Lives in [address] with some services but considered independent residence; has aids daily 9 am-5 pm and may get a few hrs more daily since 2025 hospitalization [FreeTextEntry7] : Friend Harlan Samuel: [phone] [FreeTextEntry8] : Leslie Velasco

## 2025-05-15 NOTE — REVIEW OF SYSTEMS
[Negative] : Allergic/Immunologic [FreeTextEntry9] : continues with falls at times [de-identified] : memory issues but otherwise stable

## 2025-05-15 NOTE — REASON FOR VISIT
[Access issues (e.g., transportation, impaired mobility, etc.)] : due to patient's access issues [Continuity of care] : to ensure continuity of care [Telehealth (audio & video) - Individual/Group] : This visit was provided via telehealth using real-time 2-way audio visual technology. [Other Location: e.g. Home (Enter Location, City,State)___] : The provider was located at [unfilled]. [Home] : The patient, [unfilled], was located at home, [unfilled], at the time of the visit. [Patient's space is appropriate for telehealth and maintains privacy/confidentiality.] : Patient's space is appropriate for telehealth and maintains privacy/confidentiality. [Participant(s) identity verified] : Participant(s) identity verified. [Verbal consent obtained from patient/other participant(s)] : Verbal consent for telehealth/telephonic services obtained from patient/other participant(s)

## 2025-05-15 NOTE — PSYCHOSOCIAL ASSESSMENT
[None known] : None known [HS Diploma or GED] : HS Diploma or GED [Yes (select details below)] : Have you ever experienced this type of event? Yes [tried hard not to think about the event(s) or went out of your way to avoid situations that reminded you of the event] : tried hard to avoid thinking about events or avoid situations that reminded patient of the event [has been constantly on guard, watchful, or easily startled] : has been constantly on guard, watchful, or easily startled [felt guilty or unable to stop blaming yourself or others for the event(s) or any problems the event(s) may have caused] : has felt guilty or unable to stop blaming self or others for event(s), or any problems the event(s) may have caused [Unemployed and not looking for work] : unemployed and not looking for work [SSD] : SSD [Yes] : yes [No] : Prior or current active [place]  service? No [Yes (add details)] : Patient has personal representation (legal guardian, representative payee, conservatorship)? Yes [had nightmares about the event(s) or thought about the event(s) when you did not want] : did not have nightmares and/or unwanted thoughts about the events [felt numb or detached from people, activities, or your surrounding] : has not felt numb or detached from people, activities, or surroundings [FreeTextEntry3] : has a family trust as well [FreeTextEntry6] : Lives in [address] with some services but considered independent residence; has aids daily 9 am-5 pm and may get a few hrs more daily since 2025 hospitalization [FreeTextEntry7] : Friend Harlan Samuel: [phone] [FreeTextEntry8] : Leslie Velasco

## 2025-05-15 NOTE — HISTORY OF PRESENT ILLNESS
[Not Applicable] : Not applicable [FreeTextEntry1] : Kala had been inpatient for respiratory issues for several months and then in rehab facility but now home with cat Sundance past two weeks.  Writer had to close case due to prolonged absence but reopening it again today.   Kala stated that she was told she'd be going to a nursing home if she didn't start ambulating at rehab so she worked hard to get back to walking.  She spoke of having come off of Abilify and also on less Sertraline of 225 mg daily.   [FreeTextEntry2] : Patient with long history of schizophrenia and with emotional difficulties since childhood with reports of unspecified mental problems in her family. Patient has been relatively psychiatrically stable on her medications since starting [facility] in  without psych hospitalizations though with chronic anxiety, occasional depression and occasional fleeting paranoia about others mistreating her.   Patient reports history of six prior hospitalizations for depression, suicidal ideations, paranoia and hallucinations. Stated she would see images (in her mind of scary, menacing clowns or witches laughing at her or would see 'honeymoon lights' flashing at her.) Patient stated in her 20s she had recurrent visions of a family of mice with the baby mouse doing a dance. Pt uncertain if these images had appeared realistic as in visual hallucinations or not. Pt reported that she has a history of hearing people on the television and radio taking directly to her and has felt that she's famous and known all over the world.  Patient reported first hospitalization was at age 12 at [facility] in context of "emotional issues and school phobia." Had considered herself the "scapegoat" of her family and stated that her parents were "cuckoo." Pt stated that her second hospitalization was at age 13 at [facility]. Pt went to the [facility] in [place] for residential schooling and treatment; per pt this was for mentally ill/emotionally disturbed children from "bad families." Patient stated after three months she returned home because she did not want to stay though said it was a bad decision on her part.  Patient with history of depression, anxiety and feeling "not good enough" as well as history of chronic psychosis. Patient with hx of passive SI at times and on one occasion in  stated she came close to overdosing but felt a breeze that she thought was a comforting gesture of  family and a friend offering support to her.  Patient was hospitalized again at [facility] in her 20s and reported a hospitalization in her 40s at [facility] and at [facility] also in her 40s. Pt stated her last hospitalization was when she was 52 years old at [facility] after she accidentally threw out her psychiatric medications.  Patient in therapy (monthly?) with Rosalva Frye since  by reports.   [FreeTextEntry3] : Orap, Thorazine, Mellaril, Moban, Stelazine, Sinequan, Tofranil, Geodon, Depakote, Klonopin  Currently on Clozapine and Zoloft.

## 2025-05-15 NOTE — PLAN
[Admit to Program     (Add Program Admission information to a new column in the Admit/Discharge Flowsheet)] : Admit to program [FreeTextEntry4] : -Continue Clozapine at 400 mg daily -Will be getting CBC with diff in coming days; (available next Monday and Tuesday) -Continue off Abilify  -Pt to see [facility] nurse for AIMS approximately every 3-6 months if able to come in -To continue Sertraline to 250 mg daily for depression -Continue other non-psychiatric medications as directed -Continue to reassure patient and offer support and guidance when needed -F/u about tongue injury/wound -Have reviewed Kala's experience with [facility]'s Movement Disorder specialist to evaluate possible TD (saw Dr. 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 Frye -To look for new PCP as Kala unhappy with most recently assigned one -Pt may continue with endocrinologist Pop Lopez (next 1/2025 ?) to continue to address weight issues and work on more aggressive weight loss plan; have discussed elevated BMI and recommendation for weight loss via: exercise, dietary changes and potential use of medications plus recommendation to follow up with Jessica re this -Have reviewed in detail need to limit portion size and eat diabetic diet; reviewed in detail what this would look like and reviewed risks of not following this for patient -SOS-10 to be done in future -Treatment plan to be completed next session -To reach out for CBCs to restart -Consult with writer prn

## 2025-05-15 NOTE — DISCUSSION/SUMMARY
[FreeTextEntry1] :  Patient is a 72 year old obese, domiciled (in senior living housing), unemployed white female on SSD for much of her life who presented in 2005 for continued treatment of schizophrenia. Pt with history of approximately five prior psychiatric hospitalizations but has not required hospitalization since in treatment at [facility].  Pt overall at psychiatric baseline though with some episodic anxiety, periods of depression and at times mild paranoia. Pt oddly related at baseline though overall psychiatrically stable for many years. 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  She has also had protracted hospitalization (possibly for shortness of breath but pt uncertain) and has experienced multiple falls.  Kala is overall psychiatrically stable at her baseline though with reports of periodic incontinence of bladder and bowels and some insomnia and forgetfulness. [Low acute suicide risk] : Low acute suicide risk [No] : No [Not clinically indicated] : Safety Plan completed/updated (for individuals at risk): Not clinically indicated
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
## 2025-05-15 NOTE — HISTORY OF PRESENT ILLNESS
[Not Applicable] : Not applicable [FreeTextEntry1] : Kala had been inpatient for respiratory issues for several months and then in rehab facility but now home with cat Sundance past two weeks.  Writer had to close case due to prolonged absence but reopening it again today.   Kala stated that she was told she'd be going to a nursing home if she didn't start ambulating at rehab so she worked hard to get back to walking.  She spoke of having come off of Abilify and also on less Sertraline of 225 mg daily.   [FreeTextEntry2] : Patient with long history of schizophrenia and with emotional difficulties since childhood with reports of unspecified mental problems in her family. Patient has been relatively psychiatrically stable on her medications since starting [facility] in  without psych hospitalizations though with chronic anxiety, occasional depression and occasional fleeting paranoia about others mistreating her.   Patient reports history of six prior hospitalizations for depression, suicidal ideations, paranoia and hallucinations. Stated she would see images (in her mind of scary, menacing clowns or witches laughing at her or would see 'honeymoon lights' flashing at her.) Patient stated in her 20s she had recurrent visions of a family of mice with the baby mouse doing a dance. Pt uncertain if these images had appeared realistic as in visual hallucinations or not. Pt reported that she has a history of hearing people on the television and radio taking directly to her and has felt that she's famous and known all over the world.  Patient reported first hospitalization was at age 12 at [facility] in context of "emotional issues and school phobia." Had considered herself the "scapegoat" of her family and stated that her parents were "cuckoo." Pt stated that her second hospitalization was at age 13 at [facility]. Pt went to the [facility] in [place] for residential schooling and treatment; per pt this was for mentally ill/emotionally disturbed children from "bad families." Patient stated after three months she returned home because she did not want to stay though said it was a bad decision on her part.  Patient with history of depression, anxiety and feeling "not good enough" as well as history of chronic psychosis. Patient with hx of passive SI at times and on one occasion in  stated she came close to overdosing but felt a breeze that she thought was a comforting gesture of  family and a friend offering support to her.  Patient was hospitalized again at [facility] in her 20s and reported a hospitalization in her 40s at [facility] and at [facility] also in her 40s. Pt stated her last hospitalization was when she was 52 years old at [facility] after she accidentally threw out her psychiatric medications.  Patient in therapy (monthly?) with Rosalva Frye since  by reports.   [FreeTextEntry3] : Orap, Thorazine, Mellaril, Moban, Stelazine, Sinequan, Tofranil, Geodon, Depakote, Klonopin  Currently on Clozapine and Zoloft.

## 2025-05-15 NOTE — PLAN
[Admit to Program     (Add Program Admission information to a new column in the Admit/Discharge Flowsheet)] : Admit to program [FreeTextEntry4] : -Continue Clozapine at 400 mg daily -Will be getting CBC with diff in coming days; (available next Monday and Tuesday) -Continue off Abilify  -Pt to see [facility] nurse for AIMS approximately every 3-6 months if able to come in -To continue Sertraline to 250 mg daily for depression -Continue other non-psychiatric medications as directed -Continue to reassure patient and offer support and guidance when needed -F/u about tongue injury/wound -Have reviewed Kala's experience with [facility]'s Movement Disorder specialist to evaluate possible TD (saw Dr. 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 Frye -To look for new PCP as Kala unhappy with most recently assigned one -Pt may continue with endocrinologist Pop Lopez (next 1/2025 ?) to continue to address weight issues and work on more aggressive weight loss plan; have discussed elevated BMI and recommendation for weight loss via: exercise, dietary changes and potential use of medications plus recommendation to follow up with Jessica re this -Have reviewed in detail need to limit portion size and eat diabetic diet; reviewed in detail what this would look like and reviewed risks of not following this for patient -SOS-10 to be done in future -Treatment plan to be completed next session -To reach out for CBCs to restart -Consult with writer prn

## 2025-05-15 NOTE — DISCUSSION/SUMMARY
[FreeTextEntry1] :  Patient is a 72 year old obese, domiciled (in senior living housing), unemployed white female on SSD for much of her life who presented in 2005 for continued treatment of schizophrenia. Pt with history of approximately five prior psychiatric hospitalizations but has not required hospitalization since in treatment at [facility].  Pt overall at psychiatric baseline though with some episodic anxiety, periods of depression and at times mild paranoia. Pt oddly related at baseline though overall psychiatrically stable for many years. 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  She has also had protracted hospitalization (possibly for shortness of breath but pt uncertain) and has experienced multiple falls.  Kala is overall psychiatrically stable at her baseline though with reports of periodic incontinence of bladder and bowels and some insomnia and forgetfulness. [Low acute suicide risk] : Low acute suicide risk [No] : No [Not clinically indicated] : Safety Plan completed/updated (for individuals at risk): Not clinically indicated

## 2025-05-27 NOTE — PLAN
[No] : No [Medication education provided] : Medication education provided. [FreeTextEntry5] : Offered support/encouragement, psychoeducation and counseling regarding diagnosis/diagnoses, medications, symptoms, etc.  Reviewed/discussed plan below:  -Continue Clozapine at 400 mg daily -Have ordered CBC with diff through [facility] and emailed them to resume her blood draws -Reports of having discontinues Abilify while inpatient  -Pt to see [facility] nurse for AIMS approximately every 3 months; to reschedule as she was inpatient at last scheduled apptmt -To continue Sertraline at 225 mg daily for depression -Continue other non-psychiatric medications as directed  -Continue to reassure patient and offer support and guidance when needed  -To continue f/u about tongue wound treatment- improving per pt report -Have reviewed Kala's experience with [facility]'s Movement Disorder specialist to evaluate possible TD (saw Dr. 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 Frye -To continue f/u with PCP; and also to f/u with pulmonologist prn -Pt to continue with endocrinologist Pop Lopez (next 1/2025) to continue to address weight issues and work on more aggressive weight loss plan; have discussed elevated BMI and recommendation for weight loss via: exercise, dietary changes and potential use of medications plus recommendation to follow up with PCP and or nutritionist and pt has been intermittently addressing this -SOS-10 may be done in future -Have reviewed in detail need to limit portion size and eat diabetic diet; have reviewed mutliple times in detail what this should look like and reviewed risks of not following this for patient; pt aware of her BMI, obesity and risks of this including metabolic syndrome and has connection to weight loss doctor whom she connects with periodically -Treatment plan completed by writer 9/10/24 -Consult with writer prn  5/25/22: SOS-10 = 39 or mildly impaired 12/9/22 SOS-10:= 42, minimal impairment 8/18/23: SOS-10: = 45, minimal impairment 1/24/24: SOS-10= 55, minimal impairment  30 minutes spent reviewing prior records/notes, reviewing most recent labs, seeing patient and recording session note

## 2025-05-27 NOTE — PLAN
[No] : No [Medication education provided] : Medication education provided. [FreeTextEntry5] : Offered support/encouragement, psychoeducation and counseling regarding diagnosis/diagnoses, medications, symptoms, etc.  Reviewed/discussed plan below:  -Continue Clozapine at 400 mg daily -Have ordered CBC with diff through [facility] and emailed them to resume her blood draws -Reports of having discontinues Abilify while inpatient  -Pt to see [facility] nurse for AIMS approximately every 3 months; to reschedule as she was inpatient at last scheduled apptmt -To continue Sertraline at 225 mg daily for depression -Continue other non-psychiatric medications as directed  -Continue to reassure patient and offer support and guidance when needed  -To continue f/u about tongue wound treatment- improving per pt report -Have reviewed Kala's experience with [facility]'s Movement Disorder specialist to evaluate possible TD (saw Dr. 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 Frye -To continue f/u with PCP; and also to f/u with pulmonologist prn -Pt to continue with endocrinologist Pop Lopez (next 1/2025) to continue to address weight issues and work on more aggressive weight loss plan; have discussed elevated BMI and recommendation for weight loss via: exercise, dietary changes and potential use of medications plus recommendation to follow up with PCP and or nutritionist and pt has been intermittently addressing this -SOS-10 may be done in future -Have reviewed in detail need to limit portion size and eat diabetic diet; have reviewed mutliple times in detail what this should look like and reviewed risks of not following this for patient; pt aware of her BMI, obesity and risks of this including metabolic syndrome and has connection to weight loss doctor whom she connects with periodically -Treatment plan completed by writer 9/10/24 -Consult with writer prn  5/25/22: SOS-10 = 39 or mildly impaired 12/9/22 SOS-10:= 42, minimal impairment 8/18/23: SOS-10: = 45, minimal impairment 1/24/24: SOS-10= 55, minimal impairment  30 minutes spent reviewing prior records/notes, reviewing most recent labs, seeing patient and recording session note

## 2025-05-27 NOTE — PHYSICAL EXAM
[Cooperative] : cooperative [Constricted] : constricted [Clear] : clear [Loud] : loud [[place]] : concrete [WNL] : within normal limits [No] : No [FreeTextEntry1] : morbidly obese female wearing house dress and thick glasses [FreeTextEntry8] : neutral [de-identified] : limited chronically [de-identified] : fair to limited at times

## 2025-05-27 NOTE — DISCUSSION/SUMMARY
[Date of Last Physical Exam: _____] : Date of Last Physical Exam: [unfilled] [Date of Last Annual Labs: _____] : Date of Last Annual Labs: [unfilled] [Annual Review of Systems Completed?] : Annual Review of Systems Completed: Yes [Tobacco Screening Completed?] : Tobacco Screening Completed: Yes [Date of Last AIMS: _____] : Date of Last AIMS: [unfilled] [Date of Last HbgA1c: _____] : Date of Last HbgA1c: [unfilled] [Date of Last Lipid Profile: _____] : Date of Last Lipid Profile: [unfilled] [Potential impact of patient’s physical health conditions on psychiatric care?] : Potential impact of patient's physical health conditions on psychiatric care: Yes [FreeTextEntry1] : Patient is a 712 year old obese, domiciled in assisted living program, unemployed white female on SSD for much of her life who presented in 2005 for continued treatment of schizophrenia.  Pt with history of approximately five prior psychiatric hospitalizations but has not required hospitalization since in treatment at [facility].   Pt overall at psychiatric baseline though with some episodic anxiety, periods of depression and at times mild paranoia.  Pt oddly related at baseline though overall psychiatrically stable for many years.   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   Overall at her baseline though with reports of periodic incontinence of bladder and bowels.

## 2025-05-27 NOTE — DISCUSSION/SUMMARY
[Date of Last Physical Exam: _____] : Date of Last Physical Exam: [unfilled] [Date of Last Annual Labs: _____] : Date of Last Annual Labs: [unfilled] [Annual Review of Systems Completed?] : Annual Review of Systems Completed: Yes [Tobacco Screening Completed?] : Tobacco Screening Completed: Yes [Date of Last AIMS: _____] : Date of Last AIMS: [unfilled] [Date of Last HbgA1c: _____] : Date of Last HbgA1c: [unfilled] [Date of Last Lipid Profile: _____] : Date of Last Lipid Profile: [unfilled] [Potential impact of patient’s physical health conditions on psychiatric care?] : Potential impact of patient's physical health conditions on psychiatric care: Yes [FreeTextEntry1] : Patient is a 712 year old obese, domiciled in assisted living program, unemployed white female on SSD for much of her life who presented in 2005 for continued treatment of schizophrenia.  Pt with history of approximately five prior psychiatric hospitalizations but [facility] not required [facility]   Pt overall at psychiatric baseline though with some episodic anxiety, periods of depression and at times mild paranoia.  Pt oddly related at baseline though overall psychiatrically stable for many years.   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   Overall at her baseline though with reports of periodic incontinence of bladder and bowels.
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
## 2025-05-27 NOTE — HISTORY OF PRESENT ILLNESS
[Not Applicable] : Not applicable [FreeTextEntry1] : Patient with long history of schizophrenia and with emotional difficulties since childhood with reports of unspecified mental problems in her family.  Patient has been relatively psychiatrically stable on her medications since starting [facility] in 2005 without psych hospitalizations though with chronic anxiety, occasional  depression and occasional fleeting paranoia about others mistreating her.   [FreeTextEntry2] : Patient reports history of six prior hospitalizations for depression, suicidal ideations, paranoia and hallucinations.  Stated she would see images (in her mind of scary, menacing clowns or witches laughing at her or would see 'honeymoon lights' flashing at her.)  Patient stated in her 20s she had recurrent visions of a family of mice with the baby mouse doing a dance.  Pt uncertain if these images had appeared realistic as in visual hallucinations or not.  Pt reported that she has a history of hearing people on the television and radio taking directly to her and has felt that she's famous and known all over the world.    Patient reported first hospitalization was at age 12 at [facility] in context of "emotional issues and school phobia."  Had considered herself the "scapegoat" of her family and stated that her parents were "cuckoo."  Pt stated that her second hospitalization was at age 13 at [facility].  Pt went to the [facility] in [place] for residential schooling and treatment;  per pt this was for mentally ill/emotionally disturbed children from "bad families."  Patient stated after three months she returned home because she did not want to stay though said it was a bad decision on her part.  Patient with history of depression, anxiety and feeling "not good enough" as well as history of chronic psychosis.  Patient with hx of passive SI at times and on one occasion in  stated she came close to overdosing but felt a breeze that she thought was a comforting gesture of  family and a friend offering support to her.    Patient was hospitalized again at [facility] in her 20s and reported a hospitalization in her 40s at [facility] and at [facility] also in her 40s.  Pt stated her last hospitalization was when she was 52 years old at [facility] after she accidentally threw out her psychiatric medications.   Patient in therapy (monthly?) with Rosalva Frye since  by reports [FreeTextEntry3] : Orap, Thorazine, Mellaril, Moban, Stelazine, Sinequan, Tofranil, Geodon, Depakote, Klonopin  Currently on Clozapine, Abilify and Zoloft

## 2025-05-27 NOTE — REASON FOR VISIT
[Patient preference] : as per patient preference [Access issues (e.g., transportation, impaired mobility, etc.)] : due to patient's access issues [Continuity of care] : to ensure continuity of care [Telephone (audio) - Individual/Group] : This telephonic visit was provided via audio only technology. [Home] : The patient, [unfilled], was located at home, [unfilled], at the time of the visit. [Patient's space is appropriate for telehealth and maintains privacy/confidentiality.] : Patient's space is appropriate for telehealth and maintains privacy/confidentiality. [Participant(s) identity verified] : Participant(s) identity verified. [Verbal consent obtained from patient/other participant(s)] : Verbal consent for telehealth/telephonic services obtained from patient/other participant(s) [Patient] : Patient [FreeTextEntry2] : 12/6/24 [FreeTextEntry1] : follow up treatment of Schizophrenia, FARHAN and MDD in remission

## 2025-05-27 NOTE — PHYSICAL EXAM
[Cooperative] : cooperative [Constricted] : constricted [Clear] : clear [Loud] : loud [[place]] : concrete [WNL] : within normal limits [No] : No [FreeTextEntry1] : morbidly obese female wearing house dress and thick glasses [FreeTextEntry8] : neutral [de-identified] : limited chronically [de-identified] : fair to limited at times
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
## 2025-05-27 NOTE — REVIEW OF SYSTEMS
[Negative] : Allergic/Immunologic [FreeTextEntry2] : arthritic pains  [FreeTextEntry4] : chronic ulceration of tongue improved by reports [FreeTextEntry6] : shortness of breath with walking reported (chronic)  [FreeTextEntry7] : bouts of diarrhea  [FreeTextEntry8] : urinary incontinence, chronic with potential accident in office at least once though pt states improved on new medication [FreeTextEntry9] : frequent falls (uses walker) and arthritic pains in shoulders, neck and head per pt; ankle pain since injury [de-identified] : hx of mild mouth movements that may be TD (elicited with distraction only) [de-identified] : chronic odd relatedness secondary to schizophrenia

## 2025-05-27 NOTE — REVIEW OF SYSTEMS
[Negative] : Allergic/Immunologic [FreeTextEntry2] : arthritic pains  [FreeTextEntry4] : chronic ulceration of tongue improved by reports [FreeTextEntry6] : shortness of breath with walking reported (chronic)  [FreeTextEntry7] : bouts of diarrhea  [FreeTextEntry8] : urinary incontinence, chronic with potential accident in office at least once though pt states improved on new medication [FreeTextEntry9] : frequent falls (uses walker) and arthritic pains in shoulders, neck and head per pt; ankle pain since injury [de-identified] : hx of mild mouth movements that may be TD (elicited with distraction only) [de-identified] : chronic odd relatedness secondary to schizophrenia

## 2025-05-27 NOTE — HISTORY OF PRESENT ILLNESS
[Not Applicable] : Not applicable [FreeTextEntry1] : Patient with long history of schizophrenia and with emotional difficulties since childhood with reports of unspecified mental problems in her family.  Patient has been relatively psychiatrically stable on her medications since starting [facility] in 2005 without psych hospitalizations though with chronic anxiety, occasional  depression and occasional fleeting paranoia about others mistreating her.   [FreeTextEntry2] : Patient reports history of six prior hospitalizations for depression, suicidal ideations, paranoia and hallucinations.  Stated she would see images (in her mind of scary, menacing clowns or witches laughing at her or would see 'honeymoon lights' flashing at her.)  Patient stated in her 20s she had recurrent visions of a family of mice with the baby mouse doing a dance.  Pt uncertain if these images had appeared realistic as in visual hallucinations or not.  Pt reported that she has a history of hearing people on the television and radio taking directly to her and has felt that she's famous and known all over the world.    Patient reported first hospitalization was at age 12 at [facility] in context of "emotional issues and school phobia."  Had considered herself the "scapegoat" of her family and stated that her parents were "cuckoo."  Pt stated that her second hospitalization was at age 13 at [facility].  Pt went to the [facility] in [place] for residential schooling and treatment;  per pt this was for mentally ill/emotionally disturbed children from "bad families."  Patient stated after three months she returned home because she did not want to stay though said it was a bad decision on her part.  Patient with history of depression, anxiety and feeling "not good enough" as well as history of chronic psychosis.  Patient with hx of passive SI at times and on one occasion in  stated she came close to overdosing but felt a breeze that she thought was a comforting gesture of  family and a friend offering support to her.    Patient was hospitalized again at [facility] in her 20s and reported a hospitalization in her 40s at [facility] and at [facility] also in her 40s.  Pt stated her last hospitalization was when she was 52 years old at [facility] after she accidentally threw out her psychiatric medications.   Patient in therapy (monthly?) with Rosalva Frye since  by reports [FreeTextEntry3] : Orap, Thorazine, Mellaril, Moban, Stelazine, Sinequan, Tofranil, Geodon, Depakote, Klonopin  Currently on Clozapine, Abilify and Zoloft

## 2025-06-25 NOTE — HISTORY OF PRESENT ILLNESS
[Not Applicable] : Not applicable [FreeTextEntry1] : Patient with long history of schizophrenia and with emotional difficulties since childhood with reports of unspecified mental problems in her family.  Patient has been relatively psychiatrically stable on her medications since starting [facility] in 2005 without psych hospitalizations though with chronic anxiety, occasional  depression and occasional fleeting paranoia about others mistreating her.   [FreeTextEntry2] : Patient reports history of six prior hospitalizations for depression, suicidal ideations, paranoia and hallucinations.  Stated she would see images (in her mind of scary, menacing clowns or witches laughing at her or would see 'honeymoon lights' flashing at her.)  Patient stated in her 20s she had recurrent visions of a family of mice with the baby mouse doing a dance.  Pt uncertain if these images had appeared realistic as in visual hallucinations or not.  Pt reported that she has a history of hearing people on the television and radio taking directly to her and has felt that she's famous and known all over the world.    Patient reported first hospitalization was at age 12 at [facility] in context of "emotional issues and school phobia."  Had considered herself the "scapegoat" of her family and stated that her parents were "cuckoo."  Pt stated that her second hospitalization was at age 13 at [facility].  Pt went to the [facility] in [place] for residential schooling and treatment;  per pt this was for mentally ill/emotionally disturbed children from "bad families."  Patient stated after three months she returned home because she did not want to stay though said it was a bad decision on her part.  Patient with history of depression, anxiety and feeling "not good enough" as well as history of chronic psychosis.  Patient with hx of passive SI at times and on one occasion in  stated she came close to overdosing but felt a breeze that she thought was a comforting gesture of  family and a friend offering support to her.    Patient was hospitalized again at [facility] in her 20s and reported a hospitalization in her 40s at [facility] and at [facility] also in her 40s.  Pt stated her last hospitalization was when she was 52 years old at [facility] after she accidentally threw out her psychiatric medications.   Patient in therapy (monthly?) with Rosalva Frye since  by reports [FreeTextEntry3] : Orap, Thorazine, Mellaril, Moban, Stelazine, Sinequan, Tofranil, Geodon, Depakote, Klonopin  Currently on Clozapine, Abilify and Zoloft

## 2025-06-25 NOTE — PHYSICAL EXAM
[Cooperative] : cooperative [Clear] : clear [Loud] : loud [[place]] : concrete [WNL] : within normal limits [No] : No [FreeTextEntry8] : neutral [de-identified] : limited chronically [de-identified] : fair to limited at times

## 2025-06-25 NOTE — REVIEW OF SYSTEMS
[Negative] : Allergic/Immunologic [FreeTextEntry2] : arthritic pains  [FreeTextEntry4] : chronic ulceration of tongue fully resolved per pt reports [FreeTextEntry6] : shortness of breath with walking reported (chronic)  [FreeTextEntry7] : bouts of diarrhea  [FreeTextEntry8] : urinary incontinence, chronic with potential accident in office at least once though pt states improved on new medication [FreeTextEntry9] : frequent falls (uses walker) and arthritic pains in shoulders, neck and head per pt; ankle pain since injury [de-identified] : hx of mild mouth movements that may be TD (elicited with distraction only) [de-identified] : chronic odd relatedness secondary to schizophrenia

## 2025-06-25 NOTE — REASON FOR VISIT
[Patient preference] : as per patient preference [Access issues (e.g., transportation, impaired mobility, etc.)] : due to patient's access issues [Continuity of care] : to ensure continuity of care [Telephone (audio) - Individual/Group] : This telephonic visit was provided via audio only technology. [Other Location: e.g. Home (Enter Location, City,State)___] : The provider was located at [unfilled]. [Home] : The patient, [unfilled], was located at home, [unfilled], at the time of the visit. [Patient's space is appropriate for telehealth and maintains privacy/confidentiality.] : Patient's space is appropriate for telehealth and maintains privacy/confidentiality. [Participant(s) identity verified] : Participant(s) identity verified. [Verbal consent obtained from patient/other participant(s)] : Verbal consent for telehealth/telephonic services obtained from patient/other participant(s) [Patient] : Patient [FreeTextEntry2] : 12/6/24 [FreeTextEntry1] : follow up treatment of Schizophrenia, FARHAN and MDD in remission

## 2025-06-25 NOTE — DISCUSSION/SUMMARY
[Date of Last Physical Exam: _____] : Date of Last Physical Exam: [unfilled] [Date of Last Annual Labs: _____] : Date of Last Annual Labs: [unfilled] [Annual Review of Systems Completed?] : Annual Review of Systems Completed: Yes [Tobacco Screening Completed?] : Tobacco Screening Completed: Yes [Date of Last AIMS: _____] : Date of Last AIMS: [unfilled] [Date of Last HbgA1c: _____] : Date of Last HbgA1c: [unfilled] [Date of Last Lipid Profile: _____] : Date of Last Lipid Profile: [unfilled] [Potential impact of patient’s physical health conditions on psychiatric care?] : Potential impact of patient's physical health conditions on psychiatric care: Yes [FreeTextEntry1] : Patient is a 72 year old obese, domiciled in assisted living program, unemployed white female on SSD for much of her life who presented in 2005 for continued treatment of schizophrenia.  Pt with history of approximately five prior psychiatric hospitalizations but has not required hospitalization since in treatment at [facility].   Pt overall at psychiatric baseline though with some episodic anxiety, periods of depression and at times mild paranoia.  Pt oddly related at baseline though overall psychiatrically stable for many years.   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   Overall at her baseline though with reports of periodic incontinence of bladder and bowels.

## 2025-06-25 NOTE — PLAN
[No] : No [Medication education provided] : Medication education provided. [FreeTextEntry5] : Offered support/encouragement, psychoeducation and counseling regarding diagnosis/diagnoses, medications, symptoms, etc.  Reviewed/discussed plan below:  -Continue Clozapine at 400 mg daily -Have ordered CBC with diff through [facility] and pt stated she just got completed a few days ago though no results found; emailed [facility] and lab for follow up around this -Reports of having discontinues Abilify while inpatient; may remain off it -Pt to see [facility] nurse for AIMS approximately every 3 months; to reschedule with nurse or for in person and AIMs with writer -To continue Sertraline at 225 mg daily for depression -Continue other non-psychiatric medications as directed  -Continue to reassure patient and offer support and guidance when needed  -Have reviewed Kala's experience with [facility]'s Movement Disorder specialist to evaluate possible TD (saw Dr. 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 Frye -To continue f/u with PCP; and also to f/u with pulmonologist prn -Pt to continue with endocrinologist Pop Lopez (next 1/2025) to continue to address weight issues and work on more aggressive weight loss plan; have discussed elevated BMI and recommendation for weight loss via: exercise, dietary changes and potential use of medications plus recommendation to follow up with PCP and or nutritionist and pt has been intermittently addressing this -SOS-10 may be done in future -Have reviewed in detail need to limit portion size and eat diabetic diet; have reviewed mutliple times in detail what this should look like and reviewed risks of not following this for patient; pt aware of her BMI, obesity and risks of this including metabolic syndrome and has connection to weight loss doctor whom she connects with periodically -Treatment plan completed by writer 9/10/24 -Consult with writer prn  5/25/22: SOS-10 = 39 or mildly impaired 12/9/22 SOS-10:= 42, minimal impairment 8/18/23: SOS-10: = 45, minimal impairment 1/24/24: SOS-10= 55, minimal impairment  30 minutes spent reviewing prior records/notes, troubleshooting around [facility]'s most recent labs, seeing patient and recording session note
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
## 2025-07-24 NOTE — HISTORY OF PRESENT ILLNESS
[Not Applicable] : Not applicable [FreeTextEntry1] : Patient with long history of schizophrenia and with emotional difficulties since childhood with reports of unspecified mental problems in her family.  Patient has been relatively psychiatrically stable on her medications since starting [facility] in 2005 without psych hospitalizations though with chronic anxiety, occasional  depression and occasional fleeting paranoia about others mistreating her.   [FreeTextEntry2] : Patient reports history of six prior hospitalizations for depression, suicidal ideations, paranoia and hallucinations.  Stated she would see images (in her mind of scary, menacing clowns or witches laughing at her or would see 'honeymoon lights' flashing at her.)  Patient stated in her 20s she had recurrent visions of a family of mice with the baby mouse doing a dance.  Pt uncertain if these images had appeared realistic as in visual hallucinations or not.  Pt reported that she has a history of hearing people on the television and radio taking directly to her and has felt that she's famous and known all over the world.    Patient reported first hospitalization was at age 12 at [facility] in context of "emotional issues and school phobia."  Had considered herself the "scapegoat" of her family and stated that her parents were "cuckoo."  Pt stated that her second hospitalization was at age 13 at [facility].  Pt went to the [facility] in [place] for residential schooling and treatment;  per pt this was for mentally ill/emotionally disturbed children from "bad families."  Patient stated after three months she returned home because she did not want to stay though said it was a bad decision on her part.  Patient with history of depression, anxiety and feeling "not good enough" as well as history of chronic psychosis.  Patient with hx of passive SI at times and on one occasion in  stated she came close to overdosing but felt a breeze that she thought was a comforting gesture of  family and a friend offering support to her.    Patient was hospitalized again at [facility] in her 20s and reported a hospitalization in her 40s at [facility] and at [facility] also in her 40s.  Pt stated her last hospitalization was when she was 52 years old at [facility] after she accidentally threw out her psychiatric medications.   Patient in therapy (monthly?) with Rosalva Frye since  by reports [FreeTextEntry3] : Orap, Thorazine, Mellaril, Moban, Stelazine, Sinequan, Tofranil, Geodon, Depakote, Klonopin  Currently on Clozapine, Abilify and Zoloft

## 2025-07-24 NOTE — PLAN
Infectious Disease Chart Check    Afebrile.    Note very elevated BNP and cardiology and nephrology involvement.    Continue current antibiotics until 3/1.    Following peripherally.  Please call if questions or concerns.    Balwinder Rose MD     [No] : No [Medication education provided] : Medication education provided. [FreeTextEntry5] : Offered support/encouragement, psychoeducation and counseling regarding diagnosis/diagnoses, medications, symptoms, etc.  Reviewed/discussed plan below:  -Continue Clozapine at 400 mg daily -Have ordered CBC with diff through [facility] and emailed them; alerted them that pt will not be available tomorrow for a visit -Reports of having discontinues Abilify while inpatient; may remain off it -Pt to see [facility] nurse for AIMS at least once a year -To continue Sertraline at 225 mg daily for depression -Continue other non-psychiatric medications as directed  -Continue to reassure patient and offer support and guidance when needed  -Have reviewed Kala's experience with [facility]'s Movement Disorder specialist to evaluate possible TD (saw Dr. 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 Frye -To continue f/u with PCP; and also to f/u with pulmonologist prn -Pt to continue with endocrinologist Pop Lopez (next 1/2025) to continue to address weight issues and work on more aggressive weight loss plan; have discussed elevated BMI and recommendation for weight loss via: exercise, dietary changes and potential use of medications plus recommendation to follow up with PCP and or nutritionist and pt has been intermittently addressing this -Have reviewed in detail need to limit portion size and eat diabetic diet; have reviewed mutliple times in detail what this should look like and reviewed risks of not following this for patient; pt aware of her BMI, obesity and risks of this including metabolic syndrome and has connection to weight loss doctor whom she connects with periodically -Treatment plan completed by writer 9/10/24 -Consult with terryr prn  5/25/22: SOS-10 = 39 or mildly impaired 12/9/22 SOS-10:= 42, minimal impairment 8/18/23: SOS-10: = 45, minimal impairment 1/24/24: SOS-10= 55, minimal impairment 7/24/25: SOS-10 47, minimal impairment  30 minutes spent reviewing prior records/notes, contacting [facility] for July 2025 labdraw, seeing patient and recording session note

## 2025-07-24 NOTE — HISTORY OF PRESENT ILLNESS
[Not Applicable] : Not applicable [FreeTextEntry1] : Patient with long history of schizophrenia and with emotional difficulties since childhood with reports of unspecified mental problems in her family.  Patient has been relatively psychiatrically stable on her medications since starting [facility] in 2005 without psych hospitalizations though with chronic anxiety, occasional  depression and occasional fleeting paranoia about others mistreating her.   [FreeTextEntry2] : Patient reports history of six prior hospitalizations for depression, suicidal ideations, paranoia and hallucinations.  Stated she would see images (in her mind of scary, menacing clowns or witches laughing at her or would see 'honeymoon lights' flashing at her.)  Patient stated in her 20s she had recurrent visions of a family of mice with the baby mouse doing a dance.  Pt uncertain if these images had appeared realistic as in visual hallucinations or not.  Pt reported that she has a history of hearing people on the television and radio taking directly to her and has felt that she's famous and known all over the world.    Patient reported first hospitalization was at age 12 at [facility] in context of "emotional issues and school phobia."  Had considered herself the "scapegoat" of her family and stated that her parents were "cuckoo."  Pt stated that her second hospitalization was at age 13 at [facility].  Pt went to the [facility] in [place] for residential schooling and treatment;  per pt this was for mentally ill/emotionally disturbed children from "bad families."  Patient stated after three months she returned home because she did not want to stay though said it was a bad decision on her part.  Patient with history of depression, anxiety and feeling "not good enough" as well as history of chronic psychosis.  Patient with hx of passive SI at times and on one occasion in  stated she came close to overdosing but felt a breeze that she thought was a comforting gesture of  family and a friend offering support to her.    Patient was hospitalized again at [facility] in her 20s and reported a hospitalization in her 40s at [facility] and at [facility] also in her 40s.  Pt stated her last hospitalization was when she was 52 years old at [facility] after she accidentally threw out her psychiatric medications.   Patient in therapy (monthly?) with Rosalva Frye since  by reports [FreeTextEntry3] : Orap, Thorazine, Mellaril, Moban, Stelazine, Sinequan, Tofranil, Geodon, Depakote, Klonopin  Currently on Clozapine, Abilify and Zoloft

## 2025-07-24 NOTE — PHYSICAL EXAM
[Cooperative] : cooperative [Clear] : clear [Loud] : loud [Drums] : concrete [WNL] : within normal limits [No] : No [3] : 3 [6] : 6 [5] : 5 [4] : 4 [FreeTextEntry8] : neutral [de-identified] : limited chronically [de-identified] : fair to limited at times [SchwartzScore] : 47

## 2025-07-24 NOTE — REVIEW OF SYSTEMS
[Negative] : Allergic/Immunologic [FreeTextEntry2] : arthritic pains  [FreeTextEntry4] : chronic ulceration of tongue fully resolved per pt reports [FreeTextEntry6] : shortness of breath with walking reported (chronic)  [FreeTextEntry7] : bouts of diarrhea  [FreeTextEntry8] : urinary incontinence, chronic with potential accident in office at least once though pt states improved on new medication [FreeTextEntry9] : frequent falls (uses wheelchair now) and arthritic pains in shoulders, neck and head per pt [de-identified] : hx of mild mouth movements that may be TD (elicited with distraction only) [de-identified] : chronic odd relatedness secondary to schizophrenia
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
## 2025-07-24 NOTE — DISCUSSION/SUMMARY
[Date of Last Physical Exam: _____] : Date of Last Physical Exam: [unfilled] [Date of Last Annual Labs: _____] : Date of Last Annual Labs: [unfilled] [Annual Review of Systems Completed?] : Annual Review of Systems Completed: Yes [Tobacco Screening Completed?] : Tobacco Screening Completed: Yes [Date of Last AIMS: _____] : Date of Last AIMS: [unfilled] [Date of Last HbgA1c: _____] : Date of Last HbgA1c: [unfilled] [Date of Last Lipid Profile: _____] : Date of Last Lipid Profile: [unfilled] [Potential impact of patient’s physical health conditions on psychiatric care?] : Potential impact of patient's physical health conditions on psychiatric care: Yes [FreeTextEntry1] : Patient is a 72 year old obese, domiciled in assisted living program, unemployed white female on SSD for much of her life who presented in 2005 for continued treatment of schizophrenia.  Pt with history of approximately five prior psychiatric hospitalizations but has not required hospitalization since in treatment at [facility].   Pt overall at psychiatric baseline though with some episodic anxiety, periods of depression and at times mild paranoia.  Pt oddly related at baseline though overall psychiatrically stable for many years.   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   Overall at her baseline though with reports of periodic incontinence of bladder and bowels.

## 2025-07-24 NOTE — DISCUSSION/SUMMARY
[Date of Last Physical Exam: _____] : Date of Last Physical Exam: [unfilled] [Date of Last Annual Labs: _____] : Date of Last Annual Labs: [unfilled] [Annual Review of Systems Completed?] : Annual Review of Systems Completed: Yes [Tobacco Screening Completed?] : Tobacco Screening Completed: Yes [Date of Last AIMS: _____] : Date of Last AIMS: [unfilled] [Date of Last HbgA1c: _____] : Date of Last HbgA1c: [unfilled] [Date of Last Lipid Profile: _____] : Date of Last Lipid Profile: [unfilled] [Potential impact of patient’s physical health conditions on psychiatric care?] : Potential impact of patient's physical health conditions on psychiatric care: Yes [FreeTextEntry1] : Patient is a 72 year old obese, domiciled in assisted living program, unemployed white female on SSD for much of her life who presented in 2005 for continued treatment of schizophrenia.  Pt with history of approximately five prior psychiatric hospitalizations but has not required hospitalization since in treatment at [facility].   Pt overall at psychiatric baseline though with some episodic anxiety, periods of depression and at times mild paranoia.  Pt oddly related at baseline though overall psychiatrically stable for many years.   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   Overall at her baseline though with reports of periodic incontinence of bladder and bowels.

## 2025-07-24 NOTE — REVIEW OF SYSTEMS
[Negative] : Allergic/Immunologic [FreeTextEntry2] : arthritic pains  [FreeTextEntry4] : chronic ulceration of tongue fully resolved per pt reports [FreeTextEntry6] : shortness of breath with walking reported (chronic)  [FreeTextEntry7] : bouts of diarrhea  [FreeTextEntry8] : urinary incontinence, chronic with potential accident in office at least once though pt states improved on new medication [FreeTextEntry9] : frequent falls (uses wheelchair now) and arthritic pains in shoulders, neck and head per pt [de-identified] : hx of mild mouth movements that may be TD (elicited with distraction only) [de-identified] : chronic odd relatedness secondary to schizophrenia

## 2025-07-24 NOTE — PLAN
[No] : No [Medication education provided] : Medication education provided. [FreeTextEntry5] : Offered support/encouragement, psychoeducation and counseling regarding diagnosis/diagnoses, medications, symptoms, etc.  Reviewed/discussed plan below:  -Continue Clozapine at 400 mg daily -Have ordered CBC with diff through [facility] and emailed them; alerted them that pt will not be available tomorrow for a visit -Reports of having discontinues Abilify while inpatient; may remain off it -Pt to see [facility] nurse for AIMS at least once a year -To continue Sertraline at 225 mg daily for depression -Continue other non-psychiatric medications as directed  -Continue to reassure patient and offer support and guidance when needed  -Have reviewed Kala's experience with [facility]'s Movement Disorder specialist to evaluate possible TD (saw Dr. 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 Frye -To continue f/u with PCP; and also to f/u with pulmonologist prn -Pt to continue with endocrinologist Pop Lopez (next 1/2025) to continue to address weight issues and work on more aggressive weight loss plan; have discussed elevated BMI and recommendation for weight loss via: exercise, dietary changes and potential use of medications plus recommendation to follow up with PCP and or nutritionist and pt has been intermittently addressing this -Have reviewed in detail need to limit portion size and eat diabetic diet; have reviewed mutliple times in detail what this should look like and reviewed risks of not following this for patient; pt aware of her BMI, obesity and risks of this including metabolic syndrome and has connection to weight loss doctor whom she connects with periodically -Treatment plan completed by writer 9/10/24 -Consult with terryr prn  5/25/22: SOS-10 = 39 or mildly impaired 12/9/22 SOS-10:= 42, minimal impairment 8/18/23: SOS-10: = 45, minimal impairment 1/24/24: SOS-10= 55, minimal impairment 7/24/25: SOS-10 47, minimal impairment  30 minutes spent reviewing prior records/notes, contacting [facility] for July 2025 labdraw, seeing patient and recording session note

## 2025-07-24 NOTE — PHYSICAL EXAM
[Cooperative] : cooperative [Clear] : clear [Loud] : loud [[place]omer] : concrete [WNL] : within normal limits [No] : No [3] : 3 [6] : 6 [5] : 5 [4] : 4 [FreeTextEntry8] : neutral [de-identified] : limited chronically [de-identified] : fair to limited at times [SchwartzScore] : 47
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
